# Patient Record
Sex: FEMALE | Race: WHITE | NOT HISPANIC OR LATINO | Employment: PART TIME | URBAN - METROPOLITAN AREA
[De-identification: names, ages, dates, MRNs, and addresses within clinical notes are randomized per-mention and may not be internally consistent; named-entity substitution may affect disease eponyms.]

---

## 2017-01-18 ENCOUNTER — PRE VISIT (OUTPATIENT)
Dept: CARDIOLOGY | Facility: CLINIC | Age: 42
End: 2017-01-18

## 2017-01-19 ENCOUNTER — OFFICE VISIT (OUTPATIENT)
Dept: CARDIOLOGY | Facility: CLINIC | Age: 42
End: 2017-01-19
Payer: COMMERCIAL

## 2017-01-19 VITALS
SYSTOLIC BLOOD PRESSURE: 96 MMHG | HEART RATE: 68 BPM | WEIGHT: 134.8 LBS | DIASTOLIC BLOOD PRESSURE: 64 MMHG | BODY MASS INDEX: 22.46 KG/M2 | HEIGHT: 65 IN

## 2017-01-19 DIAGNOSIS — Z95.2 S/P PULMONARY VALVE REPLACEMENT: ICD-10-CM

## 2017-01-19 DIAGNOSIS — I37.0 NONRHEUMATIC PULMONARY VALVE STENOSIS: Primary | ICD-10-CM

## 2017-01-19 PROCEDURE — 99213 OFFICE O/P EST LOW 20 MIN: CPT | Performed by: INTERNAL MEDICINE

## 2017-01-19 RX ORDER — DROSPIRENONE AND ETHINYL ESTRADIOL 0.02-3(28)
1 KIT ORAL DAILY
COMMUNITY

## 2017-01-19 NOTE — PROGRESS NOTES
HPI and Plan:   See dictation    Orders Placed This Encounter   Procedures     Follow-Up with Cardiologist     Echocardiogram       Orders Placed This Encounter   Medications     drospirenone-ethinyl estradiol (LORYNA) 3-0.02 MG per tablet     Sig: Take 1 tablet by mouth daily       There are no discontinued medications.      Encounter Diagnoses   Name Primary?     Nonrheumatic pulmonary valve stenosis Yes     S/P pulmonary valve replacement        CURRENT MEDICATIONS:  Current Outpatient Prescriptions   Medication Sig Dispense Refill     drospirenone-ethinyl estradiol (LORYNA) 3-0.02 MG per tablet Take 1 tablet by mouth daily       Probiotic Product (PROBIOTIC DAILY PO) Take by mouth daily       fish oil-omega-3 fatty acids 1000 MG capsule Take 1 g by mouth daily       LANsoprazole (PREVACID) 30 MG capsule Take 30 mg by mouth daily       CALCIUM + D 600-200 MG-IU OR TABS 1 TABLET DAILY 0 0     CORTEF 10 MG OR TABS 1 tab po alternating with 1.5 tabs po qday 0 0     SYNTHROID 112 MCG OR TABS 1 TABLET DAILY 0 0       ALLERGIES     Allergies   Allergen Reactions     Codeine      Nausea/GI upset     Sulfa Drugs      Ancef [Cefazolin] Rash       PAST MEDICAL HISTORY:  Past Medical History   Diagnosis Date     Panhypopituitarism (H)      Congenital infundibular pulmonic stenosis      Esophageal reflux      Pulmonary valve disorders      Primary pulmonary hypertension (H)      S/P pulmonary valve replacement with bioprosthetic valve 2006     and pericardial patch       PAST SURGICAL HISTORY:  Past Surgical History   Procedure Laterality Date     C excis pituitary,transnasal/septal  1988/90/92     adenoma pituitary     Hc radiation therapy special treatment procedure  1992     5 weeks to the pituitary area     C repair pulmonary art sten  1975     5 months     C appendectomy  1999     [place]     Hc tooth extraction w/forcep  age 16     2 teeth surgeries     Hc colonoscopy thru stoma, diagnostic  3/04     C anesth,ugi  "endoscopy  3/04     Dr Bettencourt/ hiatal hernia     Endoscopic injection/implant  4/2005     injection into EGD area     C replacement, pulmonary valve  1/3/2006     [place]       FAMILY HISTORY:  Family History   Problem Relation Age of Onset     Allergies No family hx of      CANCER No family hx of      DIABETES No family hx of      Lipids No family hx of      Coronary Artery Disease Sister        SOCIAL HISTORY:  Social History     Social History     Marital Status:      Spouse Name: N/A     Number of Children: 0     Years of Education: 16     Occupational History     rn [employer]     Labor and delivery      [employer]     Social History Main Topics     Smoking status: Never Smoker      Smokeless tobacco: Not on file     Alcohol Use: No     Drug Use: No     Sexual Activity: Not Currently     Other Topics Concern      Service No     Blood Transfusions No     Caffeine Concern No     1 coffee a day, maybe 1 soda a week     Occupational Exposure Yes     Hobby Hazards No     Sleep Concern No     Stress Concern No     Weight Concern No     Special Diet No     Back Care No     Exercise No     Bike Helmet No     Seat Belt Yes     Self-Exams No     Social History Narrative       Review of Systems:  Skin:  Negative       Eyes:  Positive for glasses;contacts    ENT:  Negative      Respiratory:  Negative       Cardiovascular:  Negative      Gastroenterology: Negative      Genitourinary:  Negative      Musculoskeletal:  Negative      Neurologic:  Positive for numbness or tingling of hands of the right hand at night  Psychiatric:  not assessed      Heme/Lymph/Imm:         Endocrine:  Positive for thyroid disorder      Physical Exam:  Vitals: BP 96/64 mmHg  Pulse 68  Ht 1.651 m (5' 5\")  Wt 61.145 kg (134 lb 12.8 oz)  BMI 22.43 kg/m2    Constitutional:  cooperative, alert and oriented, well developed, well nourished, in no acute distress        Skin:  warm and dry to the touch, no apparent skin lesions " or masses noted        Head:  normocephalic, no masses or lesions        Eyes:           ENT:  no pallor or cyanosis, dentition good        Neck:  carotid pulses are full and equal bilaterally, JVP normal, no carotid bruit, no thyromegaly        Chest:  normal breath sounds, clear to auscultation, normal A-P diameter, normal symmetry, normal respiratory excursion, no use of accessory muscles;healed median sternotomy scar          Cardiac: regular rhythm     crisp prosthetic valve sounds systolic ejection murmur;LUSB   early diastolic murmur;blowing;LUSB      Abdomen:  abdomen soft, non-tender, BS normoactive, no mass, no HSM, no bruits        Vascular: pulses full and equal, no bruits auscultated                                        Extremities and Back:  no deformities, clubbing, cyanosis, erythema observed;no edema              Neurological:  affect appropriate, oriented to time, person and place;no gross motor deficits              CC  Julia Helm MD  [facility]  [address] [place] [address]

## 2017-01-19 NOTE — Clinical Note
2017      Julia Helm MD    [facility]    [address] [place] [address]       RE: Giana Unger   MRN: [id number]   : 1975      Dear Dr. Helm:      It was my pleasure to see your patient, Giana Unger, who is a very pleasant 41-year-old female patient who as you know has congenital heart disease.  This is a patient who has congenital pulmonary stenosis.  She underwent pulmonary valve replacement with Dr. Kayley Villegas at the [facility] 9 years ago.  She had done well since that time.  Two years ago the echocardiogram showed that the patient had a mild degree of regurgitation across the bioprosthetic pulmonary valve.  The mean gradient was normal at 16 mmHg.  The echo was repeated at the end of December, on the .  This showed that the degree of pulmonary regurgitation was felt to be moderate.  The mean gradient across the pulmonary valve for some reason was not measured.  The peak gradient was 31.6 mmHg.  That is close to what it was 2 years ago at 29 mmHg.  The right ventricle appears to be normal in size with normal right ventricular function, and pulmonary pressures are normal.  The patient has no symptoms of right heart failure.  She has no shortness of breath.      IMPRESSION:   1.  Bioprosthetic pulmonary valve.  The bioprosthetic pulmonary valve appears to be moderately regurgitant.  I reviewed both the  and  echocardiograms, and clearly there has been deterioration in the regurgitation across this bioprosthetic pulmonary valve.  There is no evidence, however, of right ventricular dysfunction, and pulmonary pressures are normal.  The right ventricle is also normal in size.      PLAN:  I will repeat the echocardiogram in 6 months' time, which is earlier than previously.  I have asked the patient to watch out for symptoms of right heart failure including increasing ankle edema, increasing abdominal girth and shortness of breath.  The  patient does use antibiotic prophylaxis for any dental or surgical procedures.  I will look forward to seeing her again at that time.      Sincerely,            Camron Jc MD, FACC

## 2017-01-20 NOTE — PROGRESS NOTES
2017      Julia Helm MD    [facility]    [address] [place] [address]       RE: Giana Unger   MRN: [id number]   : 1975      Dear Dr. Helm:      It was my pleasure to see your patient, Giana Unger, who is a very pleasant 41-year-old female patient who as you know has congenital heart disease.  This is a patient who has congenital pulmonary stenosis.  She underwent pulmonary valve replacement with Dr. Kayley Villegas at the [facility] 9 years ago.  She had done well since that time.  Two years ago the echocardiogram showed that the patient had a mild degree of regurgitation across the bioprosthetic pulmonary valve.  The mean gradient was normal at 16 mmHg.  The echo was repeated at the end of December, on the .  This showed that the degree of pulmonary regurgitation was felt to be moderate.  The mean gradient across the pulmonary valve for some reason was not measured.  The peak gradient was 31.6 mmHg.  That is close to what it was 2 years ago at 29 mmHg.  The right ventricle appears to be normal in size with normal right ventricular function, and pulmonary pressures are normal.  The patient has no symptoms of right heart failure.  She has no shortness of breath.      IMPRESSION:   1.  Bioprosthetic pulmonary valve.  The bioprosthetic pulmonary valve appears to be moderately regurgitant.  I reviewed both the  and  echocardiograms, and clearly there has been deterioration in the regurgitation across this bioprosthetic pulmonary valve.  There is no evidence, however, of right ventricular dysfunction, and pulmonary pressures are normal.  The right ventricle is also normal in size.      PLAN:  I will repeat the echocardiogram in 6 months' time, which is earlier than previously.  I have asked the patient to watch out for symptoms of right heart failure including increasing ankle edema, increasing abdominal girth and shortness of breath.  The  patient does use antibiotic prophylaxis for any dental or surgical procedures.  I will look forward to seeing her again at that time.      Sincerely,            Camron Jc MD, FACC         CAMRON ISBELL MD, FACC             D: 2017 14:18   T: 2017 05:01   MT: CHARISSE      Name:     JEREL BURCH   MRN:      [id number]        Account:      [id number]   :      1975           Service Date: 2017      Document: [id number]

## 2017-06-29 ENCOUNTER — CARE COORDINATION (OUTPATIENT)
Dept: CARDIOLOGY | Facility: CLINIC | Age: 42
End: 2017-06-29

## 2017-06-29 NOTE — PROGRESS NOTES
Received faxed request from [facility] for cardiac clearance to participate in a chronic spine program. Form signed by Dr. Jc & faxed to [facility]. LPenfield RN

## 2017-07-29 ENCOUNTER — HEALTH MAINTENANCE LETTER (OUTPATIENT)
Age: 42
End: 2017-07-29

## 2017-10-03 ENCOUNTER — OFFICE VISIT (OUTPATIENT)
Dept: CARDIOLOGY | Facility: CLINIC | Age: 42
End: 2017-10-03
Attending: INTERNAL MEDICINE
Payer: COMMERCIAL

## 2017-10-03 ENCOUNTER — HOSPITAL ENCOUNTER (OUTPATIENT)
Dept: CARDIOLOGY | Facility: CLINIC | Age: 42
Discharge: HOME | End: 2017-10-03
Attending: INTERNAL MEDICINE | Admitting: INTERNAL MEDICINE
Payer: COMMERCIAL

## 2017-10-03 VITALS
SYSTOLIC BLOOD PRESSURE: 100 MMHG | DIASTOLIC BLOOD PRESSURE: 66 MMHG | HEART RATE: 67 BPM | BODY MASS INDEX: 22.13 KG/M2 | HEIGHT: 65 IN | WEIGHT: 132.8 LBS

## 2017-10-03 DIAGNOSIS — Z95.2 S/P PULMONARY VALVE REPLACEMENT: ICD-10-CM

## 2017-10-03 DIAGNOSIS — I37.0 NONRHEUMATIC PULMONARY VALVE STENOSIS: ICD-10-CM

## 2017-10-03 PROCEDURE — 93306 TTE W/DOPPLER COMPLETE: CPT | Mod: 26 | Performed by: INTERNAL MEDICINE

## 2017-10-03 PROCEDURE — 40000264 ECHO COMPLETE WITH OPTISON

## 2017-10-03 PROCEDURE — 25500064 ZZH RX 255 OP 636: Performed by: INTERNAL MEDICINE

## 2017-10-03 PROCEDURE — 99213 OFFICE O/P EST LOW 20 MIN: CPT | Mod: 25 | Performed by: INTERNAL MEDICINE

## 2017-10-03 RX ADMIN — HUMAN ALBUMIN MICROSPHERES AND PERFLUTREN 3 ML: 10; .22 INJECTION, SOLUTION INTRAVENOUS at 10:30

## 2017-10-03 NOTE — PROGRESS NOTES
HPI and Plan:   See dictation    Orders Placed This Encounter   Procedures     Follow-Up with Cardiologist     Echocardiogram       No orders of the defined types were placed in this encounter.      There are no discontinued medications.      Encounter Diagnoses   Name Primary?     Nonrheumatic pulmonary valve stenosis      S/P pulmonary valve replacement        CURRENT MEDICATIONS:  Current Outpatient Prescriptions   Medication Sig Dispense Refill     drospirenone-ethinyl estradiol (LORYNA) 3-0.02 MG per tablet Take 1 tablet by mouth daily       Probiotic Product (PROBIOTIC DAILY PO) Take by mouth daily       fish oil-omega-3 fatty acids 1000 MG capsule Take 1 g by mouth daily       LANsoprazole (PREVACID) 30 MG capsule Take 30 mg by mouth daily       CALCIUM + D 600-200 MG-IU OR TABS 1 TABLET DAILY 0 0     CORTEF 10 MG OR TABS 1 tab po alternating with 1.5 tabs po qday 0 0     SYNTHROID 112 MCG OR TABS 1 TABLET DAILY 0 0       ALLERGIES     Allergies   Allergen Reactions     Codeine      Nausea/GI upset     Sulfa Drugs      Ancef [Cefazolin] Rash       PAST MEDICAL HISTORY:  Past Medical History:   Diagnosis Date     Congenital infundibular pulmonic stenosis      Esophageal reflux      Panhypopituitarism (H)      Primary pulmonary hypertension (H)      Pulmonary valve disorders      S/P pulmonary valve replacement with bioprosthetic valve 2006    and pericardial patch       PAST SURGICAL HISTORY:  Past Surgical History:   Procedure Laterality Date     C ANESTH,UGI ENDOSCOPY  3/04    Dr Bettencourt/ hiatal hernia     C APPENDECTOMY  1999    [place]     C EXCIS PITUITARY,TRANSNASAL/SEPTAL  1988/90/92    adenoma pituitary     C REPAIR PULMONARY ART STEN  1975    5 months     C REPLACEMENT, PULMONARY VALVE  1/3/2006    [place]     ENDOSCOPIC INJECTION/IMPLANT  4/2005    injection into EGD area     HC COLONOSCOPY THRU STOMA, DIAGNOSTIC  3/04      RADIATION THERAPY SPECIAL TREATMENT PROCEDURE  1992    5 weeks to the pituitary  "area     HC TOOTH EXTRACTION W/FORCEP  age 16    2 teeth surgeries       FAMILY HISTORY:  Family History   Problem Relation Age of Onset     Coronary Artery Disease Sister      Allergies No family hx of      CANCER No family hx of      DIABETES No family hx of      Lipids No family hx of        SOCIAL HISTORY:  Social History     Social History     Marital status:      Spouse name: N/A     Number of children: 0     Years of education: 16     Occupational History     rn [employer]     Labor and delivery      [employer]     Social History Main Topics     Smoking status: Never Smoker     Smokeless tobacco: Never Used     Alcohol use No     Drug use: No     Sexual activity: Not Currently     Other Topics Concern      Service No     Blood Transfusions No     Caffeine Concern No     1 coffee a day, maybe 1 soda a week     Occupational Exposure Yes     Hobby Hazards No     Sleep Concern No     Stress Concern No     Weight Concern No     Special Diet No     Back Care No     Exercise No     Bike Helmet No     Seat Belt Yes     Self-Exams No     Social History Narrative       Review of Systems:  Skin:  Negative       Eyes:  Positive for glasses;contacts    ENT:  Negative      Respiratory:  Negative       Cardiovascular:  Negative      Gastroenterology: Negative      Genitourinary:  Negative      Musculoskeletal:  Negative      Neurologic:  Positive for numbness or tingling of hands of the right hand at night  Psychiatric:  Negative      Heme/Lymph/Imm:  Negative      Endocrine:  Positive for thyroid disorder      Physical Exam:  Vitals: /66  Pulse 67  Ht 1.651 m (5' 5\")  Wt 60.2 kg (132 lb 12.8 oz)  BMI 22.1 kg/m2    Constitutional:  cooperative, alert and oriented, well developed, well nourished, in no acute distress        Skin:  warm and dry to the touch, no apparent skin lesions or masses noted        Head:  normocephalic, no masses or lesions        Eyes:           ENT:  no pallor or " cyanosis, dentition good        Neck:  carotid pulses are full and equal bilaterally, JVP normal, no carotid bruit, no thyromegaly        Chest:  normal breath sounds, clear to auscultation, normal A-P diameter, normal symmetry, normal respiratory excursion, no use of accessory muscles;healed median sternotomy scar          Cardiac: regular rhythm     crisp prosthetic valve sounds systolic ejection murmur;LUSB   early diastolic murmur;blowing;LUSB      Abdomen:  abdomen soft, non-tender, BS normoactive, no mass, no HSM, no bruits        Vascular: pulses full and equal, no bruits auscultated                                        Extremities and Back:  no deformities, clubbing, cyanosis, erythema observed;no edema              Neurological:  affect appropriate, oriented to time, person and place;no gross motor deficits              CC  Camron Jc MD  [address]  [place] [address]

## 2017-10-03 NOTE — MR AVS SNAPSHOT
After Visit Summary   10/3/2017    Giana Unger    MRN: [id number]           Patient Information     Date Of Birth          1975        Visit Information        Provider Department      10/3/2017 12:45 PM Camron Jc MD [facility]        Today's Diagnoses     Nonrheumatic pulmonary valve stenosis        S/P pulmonary valve replacement           Follow-ups after your visit        Additional Services     Follow-Up with Cardiologist                 Future tests that were ordered for you today     Open Future Orders        Priority Expected Expires Ordered    Echocardiogram Routine 10/3/2018 10/4/2018 10/3/2017    Follow-Up with Cardiologist Routine 10/3/2018 10/4/2018 10/3/2017    ECHO COMPLETE WITH OPTISON Routine 7/18/2017 1/19/2018 1/19/2017            Who to contact     If you have questions or need follow up information about today's clinic visit or your schedule please contact [facility] directly at [phone].  Normal or non-critical lab and imaging results will be communicated to you by ManageIQhart, letter or phone within 4 business days after the clinic has received the results. If you do not hear from us within 7 days, please contact the clinic through [email or web address] or phone. If you have a critical or abnormal lab result, we will notify you by phone as soon as possible.  Submit refill requests through elicit or call your pharmacy and they will forward the refill request to us. Please allow 3 business days for your refill to be completed.          Additional Information About Your Visit        MyChart Information     elicit gives you secure access to your electronic health record. If you see a primary care provider, you can also send messages to your care team and make appointments. If you have questions, please call your primary care clinic.  If you do not have a primary care provider, please  "call [phone] and they will assist you.        Care EveryWhere ID     This is your Care EveryWhere ID. This could be used by other organizations to access your [facility] medical records  [id number]        Your Vitals Were     Pulse Height BMI (Body Mass Index)             67 1.651 m (5' 5\") 22.1 kg/m2          Blood Pressure from Last 3 Encounters:   10/03/17 100/66   01/19/17 96/64   12/16/14 102/66    Weight from Last 3 Encounters:   10/03/17 60.2 kg (132 lb 12.8 oz)   01/19/17 61.1 kg (134 lb 12.8 oz)   12/16/14 59.9 kg (132 lb)              We Performed the Following     Follow-Up with Cardiologist        Primary Care Provider Office Phone # Fax #    Julia Helm [phone]       [facility] [address] [facility] [address]        Equal Access to Services     [facility] CenterLALITHA : Hadii aad ku hadasho Soomaali, waaxda luqadaha, qaybta kaalmada adeegyada, waxay idiin hayjesusn farooq lyles . So [facility] [phone].    ATENCIÓN: Si habla español, tiene a bustillo disposición servicios gratuitos de asistencia lingüística. Llame al [phone].    We comply with applicable federal civil rights laws and [place] laws. We do not discriminate on the basis of race, color, national origin, age, disability, sex, sexual orientation, or gender identity.            Thank you!     Thank you for choosing [facility] [place]  for your care. Our goal is always to provide you with excellent care. Hearing back from our patients is one way we can continue to improve our services. Please take a few minutes to complete the written survey that you may receive in the mail after your visit with us. Thank you!             Your Updated Medication List - Protect others around you: Learn how to safely use, store and throw away your medicines at www.disposemymeds.org.          This list is accurate as of: 10/3/17  1:30 PM.  Always use your most recent med list.             "       Brand Name Dispense Instructions for use Diagnosis    calcium + D 600-200 MG-UNIT Tabs   Generic drug:  calcium carbonate-vitamin D     0    1 TABLET DAILY    Panhypopituitarism (H)       CORTEF 10 MG tablet   Generic drug:  hydrocortisone     0    1 tab po alternating with 1.5 tabs po qday    Panhypopituitarism (H)       fish oil-omega-3 fatty acids 1000 MG capsule      Take 1 g by mouth daily        LANsoprazole 30 MG CR capsule    PREVACID     Take 30 mg by mouth daily        LORYNA 3-0.02 MG per tablet   Generic drug:  drospirenone-ethinyl estradiol      Take 1 tablet by mouth daily        PROBIOTIC DAILY PO      Take by mouth daily        SYNTHROID 112 MCG tablet   Generic drug:  levothyroxine     0    1 TABLET DAILY    Panhypopituitarism (H)

## 2017-10-03 NOTE — LETTER
10/3/2017    Julia Helm MD  [facility]   [address]  [facility] [address]    RE: Giana Unger       Dear Colleague,    I had the pleasure of seeing Giana Unger in the [facility].    REFERRING PHYSICIAN:  Julia Helm MD       It has been a pleasure to see your patient, Giana Unger, in followup.  As you know, this is a 42-year-old patient with a history of infundibular and pulmonary valvular stenosis.  The patient was developing right ventricular dilatation of the right ventricle.  The patient then underwent a pulmonary valve replacement with Dr. Kayley Villegas.  A bioprosthetic valve was implanted.  She is doing quite well at present.  She has no symptoms at all.  The gradients across the pulmonary valve are in fact less than they were previously.  The degree of pulmonary regurgitation has not changed significantly. I would have estimated it as mild on viewing the pulmonary valve prosthesis myself.  Right ventricular size and function look good.  The mean gradient across the pulmonary valve was 11 mmHg with a peak gradient of 14.7 mmHg.  She has no ankle edema.  She has no shortness of breath.  She does use antibiotic prophylaxis for any dental or surgical procedures.  Her blood pressure is normal at 100/66.     Outpatient Encounter Prescriptions as of 10/3/2017   Medication Sig Dispense Refill     drospirenone-ethinyl estradiol (LORYNA) 3-0.02 MG per tablet Take 1 tablet by mouth daily       Probiotic Product (PROBIOTIC DAILY PO) Take by mouth daily       fish oil-omega-3 fatty acids 1000 MG capsule Take 1 g by mouth daily       LANsoprazole (PREVACID) 30 MG capsule Take 30 mg by mouth daily       CALCIUM + D 600-200 MG-IU OR TABS 1 TABLET DAILY 0 0     CORTEF 10 MG OR TABS 1 tab po alternating with 1.5 tabs po qday 0 0     SYNTHROID 112 MCG OR TABS 1 TABLET DAILY 0 0     [DISCONTINUED] sodium chloride (PF) 0.9% PF flush 10 mL        No  facility-administered encounter medications on file as of 10/3/2017.       IMPRESSION:   1.  Bioprosthetic pulmonary valve.  The valve has not deteriorated from 6 months ago.  The gradients are not severely stenotic and the degree of pulmonary regurgitation has not changed.     2.  Normotensive.      PLAN:  I will see the patient back again in 1 year's time and as always, I have reminded the patient about using antibiotic prophylaxis for any dental or surgical procedures.      It has been my pleasure to be involved in the care of this extremely nice patient.     Sincerely,    Camron Jc MD    [facility]

## 2017-10-03 NOTE — PROGRESS NOTES
REFERRING PHYSICIAN:  Julia Helm MD       HISTORY OF PRESENT ILLNESS:  It has been a pleasure to see your patient, Giana Burch, in followup.  As you know, this is a 42-year-old patient with a history of infundibular and pulmonary valvular stenosis.  The patient was developing right ventricular dilatation of the right ventricle.  The patient then underwent a pulmonary valve replacement with Dr. Kayley Villegas.  A bioprosthetic valve was implanted.  She is doing quite well at present.  She has no symptoms at all.  The gradients across the pulmonary valve are in fact less than they were previously.  The degree of pulmonary regurgitation has not changed significantly. I would have estimated it as mild on viewing the pulmonary valve prosthesis myself.  Right ventricular size and function look good.  The mean gradient across the pulmonary valve was 11 mmHg with a peak gradient of 14.7 mmHg.  She has no ankle edema.  She has no shortness of breath.  She does use antibiotic prophylaxis for any dental or surgical procedures.  Her blood pressure is normal at 100/66.      IMPRESSION:   1.  Bioprosthetic pulmonary valve.  The valve has not deteriorated from 6 months ago.  The gradients are not severely stenotic and the degree of pulmonary regurgitation has not changed.     2.  Normotensive.      PLAN:  I will see the patient back again in 1 year's time and as always, I have reminded the patient about using antibiotic prophylaxis for any dental or surgical procedures.      It has been my pleasure to be involved in the care of this extremely nice patient.      Camron Ramey MD, FACC         CAMRON RAMEY MD, FACC             D: 10/03/2017 13:28   T: 10/03/2017 14:57   MT: DEVON      Name:     GIANA BURCH   MRN:      [id number]        Account:      [id number]   :      1975           Service Date: 10/03/2017      Document: [id number]

## 2018-11-13 ENCOUNTER — HOSPITAL ENCOUNTER (OUTPATIENT)
Dept: CARDIOLOGY | Facility: CLINIC | Age: 43
Discharge: HOME | End: 2018-11-13
Attending: INTERNAL MEDICINE | Admitting: INTERNAL MEDICINE
Payer: COMMERCIAL

## 2018-11-13 DIAGNOSIS — Z95.2 S/P PULMONARY VALVE REPLACEMENT: ICD-10-CM

## 2018-11-13 DIAGNOSIS — I37.0 NONRHEUMATIC PULMONARY VALVE STENOSIS: ICD-10-CM

## 2018-11-13 PROCEDURE — 93306 TTE W/DOPPLER COMPLETE: CPT | Mod: 26 | Performed by: INTERNAL MEDICINE

## 2018-11-13 PROCEDURE — 93306 TTE W/DOPPLER COMPLETE: CPT

## 2018-11-27 ENCOUNTER — OFFICE VISIT (OUTPATIENT)
Dept: CARDIOLOGY | Facility: CLINIC | Age: 43
End: 2018-11-27
Payer: COMMERCIAL

## 2018-11-27 VITALS
SYSTOLIC BLOOD PRESSURE: 100 MMHG | DIASTOLIC BLOOD PRESSURE: 70 MMHG | BODY MASS INDEX: 22.64 KG/M2 | WEIGHT: 135.9 LBS | HEART RATE: 60 BPM | HEIGHT: 65 IN

## 2018-11-27 DIAGNOSIS — Z95.2 S/P PULMONARY VALVE REPLACEMENT: ICD-10-CM

## 2018-11-27 DIAGNOSIS — I37.0 NONRHEUMATIC PULMONARY VALVE STENOSIS: ICD-10-CM

## 2018-11-27 PROCEDURE — 99214 OFFICE O/P EST MOD 30 MIN: CPT | Performed by: INTERNAL MEDICINE

## 2018-11-27 NOTE — PROGRESS NOTES
Service Date: 11/27/2018      REFERRING PHYSICIAN:  Dr. Julia Helm.        HISTORY OF PRESENT ILLNESS:  It is my pleasure to see your patient, Giana Unger, who is a very pleasant patient who had both infundibular and pulmonary valvular stenosis.  She had a repair as a child.  Then she began to develop severe pulmonary regurgitation with evidence of right ventricular enlargement and right ventricular dysfunction.  In 2006 she underwent bioprosthetic aortic valve replacement with Dr. Cj Villegas at [facility].  The right ventricular size and function then returned to normal.  The patient has perivalvular pulmonary regurgitation.  The mean gradient has always been somewhat on the higher side.  Going back to 2014, the mean gradient across the pulmonary prosthesis was 16 mmHg with a maximum instantaneous gradient of 29 mmHg.  Last year the degree of the mean gradient across the pulmonary valve was 8 mmHg with a maximum instantaneous gradient of 14.7 mmHg.  This year the mean gradient across the pulmonary valve prosthesis was 14 mmHg with a maximum instantaneous gradient of 23 mmHg.  So the mean gradient tends to vary.  Her right ventricular size and systolic function are normal.  The degree of pulmonary regurgitation was called moderate.  I looked at the echo myself and I would call it more on the milder side in the mild to moderate range.  There does not seem to be any significant difference in degree of regurgitation from previously.  She feels well.  She has no shortness of breath.  She has no orthopnea or PND.  She does use antibiotic prophylaxis for any dental or surgical procedures.  Her blood pressure is normal at 100/70.      IMPRESSION:   1.  Bioprosthetic pulmonary valve with mildly increased mean gradients across the pulmonary valve which are not significantly different from 2014 or 2015.  I would assess the degree of bioprosthetic pulmonary regurgitation to be in the mild to moderate range and  unchanged from previously.   2.  Does use antibiotic prophylaxis.   3.  No evidence of right ventricular dysfunction or right ventricular enlargement.      PLAN:  We will continue to follow the patient, and the patient will be employing antibiotic prophylaxis for any dental or surgical procedures.  We will see the patient back again in 1 year's time, and we will repeat her echocardiogram at that stage.      It has been my pleasure to be involved in the care of this very nice patient.   Camron Jc MD, FACC       cc:   [place] [address]         CAMRON ISBELL MD, FACC             D: 2018   T: 2018   MT: CHARISSE      Name:     JEREL BURCH   MRN:      [id number]        Account:      [id number]   :      1975           Service Date: 2018      Document: [id number]

## 2018-11-27 NOTE — PROGRESS NOTES
HPI and Plan:   See dictation    Orders Placed This Encounter   Procedures     Follow-Up with Cardiologist     Echocardiogram       Orders Placed This Encounter   Medications     AMOXICILLIN PO     Sig: Take 1,000 mg by mouth TAKES BEFORE DENTAL VISIT       There are no discontinued medications.      Encounter Diagnoses   Name Primary?     Nonrheumatic pulmonary valve stenosis      S/P pulmonary valve replacement        CURRENT MEDICATIONS:  Current Outpatient Prescriptions   Medication Sig Dispense Refill     AMOXICILLIN PO Take 1,000 mg by mouth TAKES BEFORE DENTAL VISIT       CALCIUM + D 600-200 MG-IU OR TABS 1 TABLET DAILY 0 0     CORTEF 10 MG OR TABS 1 tab po alternating with 1.5 tabs po qday 0 0     drospirenone-ethinyl estradiol (LORYNA) 3-0.02 MG per tablet Take 1 tablet by mouth daily       fish oil-omega-3 fatty acids 1000 MG capsule Take 1 g by mouth daily       LANsoprazole (PREVACID) 30 MG capsule Take 30 mg by mouth daily       Probiotic Product (PROBIOTIC DAILY PO) Take by mouth daily       SYNTHROID 112 MCG OR TABS 1 TABLET DAILY 0 0       ALLERGIES     Allergies   Allergen Reactions     Ancef [Cefazolin] Rash and Hives     Codeine      Nausea/GI upset     Nitrofuran Derivatives Other (See Comments)     nausea and dizziness     Sulfa Drugs Hives       PAST MEDICAL HISTORY:  Past Medical History:   Diagnosis Date     Congenital infundibular pulmonic stenosis      Esophageal reflux      Heart valve replaced 1/12/2006     Problem list name updated by automated process. Provider to review     Low back pain      Osteopenia      Panhypopituitarism (H)      Pituitary adenoma (H) 1986    surgeries x 3     Primary pulmonary hypertension (H)      Pulmonary valve disorders      S/P pulmonary valve replacement with bioprosthetic valve 2006    and pericardial patch     Sciatica        PAST SURGICAL HISTORY:  Past Surgical History:   Procedure Laterality Date     C ANESTH,UGI ENDOSCOPY  3/04    Dr Bettencourt/ [place]  hernia     C APPENDECTOMY  1999    [place]     C EXCIS PITUITARY,TRANSNASAL/SEPTAL  1988/90/92    adenoma pituitary     C REPAIR PULMONARY ART STEN  1975    5 months     C REPLACEMENT, PULMONARY VALVE  1/3/2006    [place]     ENDOSCOPIC INJECTION/IMPLANT  4/2005    injection into EGD area     HC COLONOSCOPY THRU STOMA, DIAGNOSTIC  3/04     HC RADIATION THERAPY SPECIAL TREATMENT PROCEDURE  1992    5 weeks to the pituitary area     HC TOOTH EXTRACTION W/FORCEP  age 16    2 teeth surgeries       FAMILY HISTORY:  Family History   Problem Relation Age of Onset     Coronary Artery Disease Sister      Allergies No family hx of      Cancer No family hx of      Diabetes No family hx of      Lipids No family hx of        SOCIAL HISTORY:  Social History     Social History     Marital status:      Spouse name: N/A     Number of children: 0     Years of education: 16     Occupational History     rn [employer]     Labor and delivery      [employer]     Social History Main Topics     Smoking status: Never Smoker     Smokeless tobacco: Never Used     Alcohol use No     Drug use: No     Sexual activity: Not Currently     Other Topics Concern      Service No     Blood Transfusions No     Caffeine Concern No     1 coffee a day, maybe 1 soda a week     Occupational Exposure Yes     Hobby Hazards No     Sleep Concern No     Stress Concern No     Weight Concern No     Special Diet No     Back Care No     Exercise No     Bike Helmet No     Seat Belt Yes     Self-Exams No     Social History Narrative       Review of Systems:  Skin:  Negative       Eyes:  Positive for glasses;contacts    ENT:  Negative      Respiratory:  Negative       Cardiovascular:  Negative      Gastroenterology: Negative      Genitourinary:  Negative      Musculoskeletal:  Positive for back pain    Neurologic:  Positive for numbness or tingling of hands of the right hand at night  Psychiatric:  Negative      Heme/Lymph/Imm:  Negative     "  Endocrine:  Positive for thyroid disorder      Physical Exam:  Vitals: /70 (BP Location: Right arm, Patient Position: Sitting, Cuff Size: Adult Regular)  Pulse 60  Ht 1.651 m (5' 5\")  Wt 61.6 kg (135 lb 14.4 oz)  Breastfeeding? No  BMI 22.61 kg/m2    Constitutional:  cooperative, alert and oriented, well developed, well nourished, in no acute distress        Skin:  warm and dry to the touch, no apparent skin lesions or masses noted          Head:  normocephalic, no masses or lesions        Eyes:  pupils equal and round        Lymph:No Cervical lymphadenopathy present     ENT:  no pallor or cyanosis, dentition good        Neck:  carotid pulses are full and equal bilaterally, JVP normal, no carotid bruit        Respiratory:  normal breath sounds, clear to auscultation, normal A-P diameter, normal symmetry, normal respiratory excursion, no use of accessory muscles;healed median sternotomy scar         Cardiac: regular rhythm     crisp prosthetic valve sounds systolic ejection murmur;LUSB   early diastolic murmur;blowing;LUSB    pulses full and equal, no bruits auscultated                                        GI:  abdomen soft, non-tender, BS normoactive, no mass, no HSM, no bruits        Extremities and Muscular Skeletal:  no deformities, clubbing, cyanosis, erythema observed;no edema              Neurological:  no gross motor deficits;affect appropriate        Psych:  Alert and Oriented x 3        CC  Camron Jc MD  [address]  [place] [id number]              "

## 2018-11-27 NOTE — LETTER
11/27/2018      Julia Helm MD  [facility] [address]  [facility] [address]      RE: Giana Unger       Dear Colleague,    I had the pleasure of seeing Giana Unger in the [facility].    Service Date: 11/27/2018      REFERRING PHYSICIAN:  Dr. Julia Helm.        HISTORY OF PRESENT ILLNESS:  It is my pleasure to see your patient, Giana Unger, who is a very pleasant patient who had both infundibular and pulmonary valvular stenosis.  She had a repair as a child.  Then she began to develop severe pulmonary regurgitation with evidence of right ventricular enlargement and right ventricular dysfunction.  In 2006 she underwent bioprosthetic aortic valve replacement with Dr. Cj Villegas at [facility].  The right ventricular size and function then returned to normal.  The patient has perivalvular pulmonary regurgitation.  The mean gradient has always been somewhat on the higher side.  Going back to 2014, the mean gradient across the pulmonary prosthesis was 16 mmHg with a maximum instantaneous gradient of 29 mmHg.  Last year the degree of the mean gradient across the pulmonary valve was 8 mmHg with a maximum instantaneous gradient of 14.7 mmHg.  This year the mean gradient across the pulmonary valve prosthesis was 14 mmHg with a maximum instantaneous gradient of 23 mmHg.  So the mean gradient tends to vary.  Her right ventricular size and systolic function are normal.  The degree of pulmonary regurgitation was called moderate.  I looked at the echo myself and I would call it more on the milder side in the mild to moderate range.  There does not seem to be any significant difference in degree of regurgitation from previously.  She feels well.  She has no shortness of breath.  She has no orthopnea or PND.  She does use antibiotic prophylaxis for any dental or surgical procedures.  Her blood pressure is normal at 100/70.      IMPRESSION:   1.  Bioprosthetic  pulmonary valve with mildly increased mean gradients across the pulmonary valve which are not significantly different from 2014 or 2015.  I would assess the degree of bioprosthetic pulmonary regurgitation to be in the mild to moderate range and unchanged from previously.   2.  Does use antibiotic prophylaxis.   3.  No evidence of right ventricular dysfunction or right ventricular enlargement.      PLAN:  We will continue to follow the patient, and the patient will be employing antibiotic prophylaxis for any dental or surgical procedures.  We will see the patient back again in 1 year's time, and we will repeat her echocardiogram at that stage.      It has been my pleasure to be involved in the care of this very nice patient.   Camron Jc MD, FACC       cc:   [place] [address]         CAMRON ISBELL MD, FACC             D: 2018   T: 2018   MT: CHARISSE      Name:     JEREL BURCH   MRN:      [id number]        Account:      [id number]   :      1975           Service Date: 2018      Document: [id number]         Outpatient Encounter Prescriptions as of 2018   Medication Sig Dispense Refill     AMOXICILLIN PO Take 1,000 mg by mouth TAKES BEFORE DENTAL VISIT       CALCIUM + D 600-200 MG-IU OR TABS 1 TABLET DAILY 0 0     CORTEF 10 MG OR TABS 1 tab po alternating with 1.5 tabs po qday 0 0     drospirenone-ethinyl estradiol (LORYNA) 3-0.02 MG per tablet Take 1 tablet by mouth daily       fish oil-omega-3 fatty acids 1000 MG capsule Take 1 g by mouth daily       LANsoprazole (PREVACID) 30 MG capsule Take 30 mg by mouth daily       Probiotic Product (PROBIOTIC DAILY PO) Take by mouth daily       SYNTHROID 112 MCG OR TABS 1 TABLET DAILY 0 0     No facility-administered encounter medications on file as of 2018.        Again, thank you for allowing me to participate in the care of your patient.       Sincerely,    Camron Jc MD, MD     [facility]

## 2018-11-27 NOTE — LETTER
11/27/2018    Julia Helm MD  [facility] [address]  [facility] [address]    RE: Giana Unger       Dear Colleague,    I had the pleasure of seeing Giana Unger in the [facility].    HPI and Plan:   See dictation    Orders Placed This Encounter   Procedures     Follow-Up with Cardiologist     Echocardiogram       Orders Placed This Encounter   Medications     AMOXICILLIN PO     Sig: Take 1,000 mg by mouth TAKES BEFORE DENTAL VISIT       There are no discontinued medications.      Encounter Diagnoses   Name Primary?     Nonrheumatic pulmonary valve stenosis      S/P pulmonary valve replacement        CURRENT MEDICATIONS:  Current Outpatient Prescriptions   Medication Sig Dispense Refill     AMOXICILLIN PO Take 1,000 mg by mouth TAKES BEFORE DENTAL VISIT       CALCIUM + D 600-200 MG-IU OR TABS 1 TABLET DAILY 0 0     CORTEF 10 MG OR TABS 1 tab po alternating with 1.5 tabs po qday 0 0     drospirenone-ethinyl estradiol (LORYNA) 3-0.02 MG per tablet Take 1 tablet by mouth daily       fish oil-omega-3 fatty acids 1000 MG capsule Take 1 g by mouth daily       LANsoprazole (PREVACID) 30 MG capsule Take 30 mg by mouth daily       Probiotic Product (PROBIOTIC DAILY PO) Take by mouth daily       SYNTHROID 112 MCG OR TABS 1 TABLET DAILY 0 0       ALLERGIES     Allergies   Allergen Reactions     Ancef [Cefazolin] Rash and Hives     Codeine      Nausea/GI upset     Nitrofuran Derivatives Other (See Comments)     nausea and dizziness     Sulfa Drugs Hives       PAST MEDICAL HISTORY:  Past Medical History:   Diagnosis Date     Congenital infundibular pulmonic stenosis      Esophageal reflux      Heart valve replaced 1/12/2006     Problem list name updated by automated process. Provider to review     Low back pain      Osteopenia      Panhypopituitarism (H)      Pituitary adenoma (H) 1986    surgeries x 3     Primary pulmonary hypertension (H)      Pulmonary valve  disorders      S/P pulmonary valve replacement with bioprosthetic valve 2006    and pericardial patch     Sciatica        PAST SURGICAL HISTORY:  Past Surgical History:   Procedure Laterality Date     C ANESTH,UGI ENDOSCOPY  3/04    Dr Bettencourt/ hiatal hernia     C APPENDECTOMY  1999    [place]     C EXCIS PITUITARY,TRANSNASAL/SEPTAL  1988/90/92    adenoma pituitary     C REPAIR PULMONARY ART STEN  1975    5 months     C REPLACEMENT, PULMONARY VALVE  1/3/2006    [place]     ENDOSCOPIC INJECTION/IMPLANT  4/2005    injection into EGD area     HC COLONOSCOPY THRU STOMA, DIAGNOSTIC  3/04     HC RADIATION THERAPY SPECIAL TREATMENT PROCEDURE  1992    5 weeks to the pituitary area     HC TOOTH EXTRACTION W/FORCEP  age 16    2 teeth surgeries       FAMILY HISTORY:  Family History   Problem Relation Age of Onset     Coronary Artery Disease Sister      Allergies No family hx of      Cancer No family hx of      Diabetes No family hx of      Lipids No family hx of        SOCIAL HISTORY:  Social History     Social History     Marital status:      Spouse name: N/A     Number of children: 0     Years of education: 16     Occupational History     rn [employer]     Labor and delivery      [employer]     Social History Main Topics     Smoking status: Never Smoker     Smokeless tobacco: Never Used     Alcohol use No     Drug use: No     Sexual activity: Not Currently     Other Topics Concern      Service No     Blood Transfusions No     Caffeine Concern No     1 coffee a day, maybe 1 soda a week     Occupational Exposure Yes     Hobby Hazards No     Sleep Concern No     Stress Concern No     Weight Concern No     Special Diet No     Back Care No     Exercise No     Bike Helmet No     Seat Belt Yes     Self-Exams No     Social History Narrative       Review of Systems:  Skin:  Negative       Eyes:  Positive for glasses;contacts    ENT:  Negative      Respiratory:  Negative       Cardiovascular:  Negative     "  Gastroenterology: Negative      Genitourinary:  Negative      Musculoskeletal:  Positive for back pain    Neurologic:  Positive for numbness or tingling of hands of the right hand at night  Psychiatric:  Negative      Heme/Lymph/Imm:  Negative      Endocrine:  Positive for thyroid disorder      Physical Exam:  Vitals: /70 (BP Location: Right arm, Patient Position: Sitting, Cuff Size: Adult Regular)  Pulse 60  Ht 1.651 m (5' 5\")  Wt 61.6 kg (135 lb 14.4 oz)  Breastfeeding? No  BMI 22.61 kg/m2    Constitutional:  cooperative, alert and oriented, well developed, well nourished, in no acute distress        Skin:  warm and dry to the touch, no apparent skin lesions or masses noted          Head:  normocephalic, no masses or lesions        Eyes:  pupils equal and round        Lymph:No Cervical lymphadenopathy present     ENT:  no pallor or cyanosis, dentition good        Neck:  carotid pulses are full and equal bilaterally, JVP normal, no carotid bruit        Respiratory:  normal breath sounds, clear to auscultation, normal A-P diameter, normal symmetry, normal respiratory excursion, no use of accessory muscles;healed median sternotomy scar         Cardiac: regular rhythm     crisp prosthetic valve sounds systolic ejection murmur;LUSB   early diastolic murmur;blowing;LUSB    pulses full and equal, no bruits auscultated                                        GI:  abdomen soft, non-tender, BS normoactive, no mass, no HSM, no bruits        Extremities and Muscular Skeletal:  no deformities, clubbing, cyanosis, erythema observed;no edema              Neurological:  no gross motor deficits;affect appropriate        Psych:  Alert and Oriented x 3        CC  Camron Jc MD  [address] [address]                Thank you for allowing me to participate in the care of your patient.      Sincerely,     Camron Jc MD, MD     [facility]    cc: "   Camron Jc MD  [address]  [place] [address]

## 2018-11-27 NOTE — MR AVS SNAPSHOT
After Visit Summary   11/27/2018    Giana Unger    MRN: [id number]           Patient Information     Date Of Birth          1975        Visit Information        Provider Department      11/27/2018 9:15 AM Camron Jc MD [facility]        Today's Diagnoses     Nonrheumatic pulmonary valve stenosis        S/P pulmonary valve replacement           Follow-ups after your visit        Additional Services     Follow-Up with Cardiologist                 Future tests that were ordered for you today     Open Future Orders        Priority Expected Expires Ordered    Echocardiogram Routine 11/27/2019 11/28/2019 11/27/2018    Follow-Up with Cardiologist Routine 11/27/2019 11/28/2019 11/27/2018            Who to contact     If you have questions or need follow up information about today's clinic visit or your schedule please contact [facility] directly at [phone].  Normal or non-critical lab and imaging results will be communicated to you by MyChart, letter or phone within 4 business days after the clinic has received the results. If you do not hear from us within 7 days, please contact the clinic through [employer] or phone. If you have a critical or abnormal lab result, we will notify you by phone as soon as possible.  Submit refill requests through [employer] or call your pharmacy and they will forward the refill request to us. Please allow 3 business days for your refill to be completed.          Additional Information About Your Visit        MyChart Information     [employer] gives you secure access to your electronic health record. If you see a primary care provider, you can also send messages to your care team and make appointments. If you have questions, please call your primary care clinic.  If you do not have a primary care provider, please call [phone] and they will assist you.        Care  "EveryWhere ID     This is your Care EveryWhere ID. This could be used by other organizations to access your [place] medical records  [id number]        Your Vitals Were     Pulse Height Breastfeeding? BMI (Body Mass Index)          60 1.651 m (5' 5\") No 22.61 kg/m2         Blood Pressure from Last 3 Encounters:   11/27/18 100/70   10/03/17 100/66   01/19/17 96/64    Weight from Last 3 Encounters:   11/27/18 61.6 kg (135 lb 14.4 oz)   10/03/17 60.2 kg (132 lb 12.8 oz)   01/19/17 61.1 kg (134 lb 12.8 oz)              We Performed the Following     Follow-Up with Cardiologist        Primary Care Provider Office Phone # Fax #    Julia Helm [phone]       [facility] MEDICAL [facility] [address] [address]        Equal Access to Services     FORD PERKINS : Hadii aad ku hadasho Soomaali, waaxda luqadaha, qaybta kaalmada adeegyada, waxay isabellain hayjesusn farooq lyles . So [facility] [phone].    ATENCIÓN: Si habla español, tiene a bustillo disposición servicios gratuitos de asistencia lingüística. Llame al [phone].    We comply with applicable federal civil rights laws and [place] laws. We do not discriminate on the basis of race, color, national origin, age, disability, sex, sexual orientation, or gender identity.            Thank you!     Thank you for choosing [facility] HEART [facility]  for your care. Our goal is always to provide you with excellent care. Hearing back from our patients is one way we can continue to improve our services. Please take a few minutes to complete the written survey that you may receive in the mail after your visit with us. Thank you!             Your Updated Medication List - Protect others around you: Learn how to safely use, store and throw away your medicines at www.disposemymeds.org.          This list is accurate as of 11/27/18 10:03 AM.  Always use your most recent med list.                   Brand Name Dispense " Instructions for use Diagnosis    AMOXICILLIN PO      Take 1,000 mg by mouth TAKES BEFORE DENTAL VISIT        calcium + D 600-200 MG-UNIT Tabs   Generic drug:  calcium carbonate-vitamin D     0    1 TABLET DAILY    Panhypopituitarism (H)       CORTEF 10 MG tablet   Generic drug:  hydrocortisone     0    1 tab po alternating with 1.5 tabs po qday    Panhypopituitarism (H)       fish oil-omega-3 fatty acids 1000 MG capsule      Take 1 g by mouth daily        LANsoprazole 30 MG DR capsule    PREVACID     Take 30 mg by mouth daily        LORYNA 3-0.02 MG per tablet   Generic drug:  drospirenone-ethinyl estradiol      Take 1 tablet by mouth daily        PROBIOTIC DAILY PO      Take by mouth daily        SYNTHROID 112 MCG tablet   Generic drug:  levothyroxine     0    1 TABLET DAILY    Panhypopituitarism (H)

## 2019-11-04 ENCOUNTER — HEALTH MAINTENANCE LETTER (OUTPATIENT)
Age: 44
End: 2019-11-04

## 2020-01-27 ENCOUNTER — HOSPITAL ENCOUNTER (OUTPATIENT)
Dept: CARDIOLOGY | Facility: CLINIC | Age: 45
Discharge: HOME | End: 2020-01-27
Attending: INTERNAL MEDICINE | Admitting: INTERNAL MEDICINE
Payer: COMMERCIAL

## 2020-01-27 DIAGNOSIS — I37.0 NONRHEUMATIC PULMONARY VALVE STENOSIS: ICD-10-CM

## 2020-01-27 DIAGNOSIS — Z95.2 S/P PULMONARY VALVE REPLACEMENT: ICD-10-CM

## 2020-01-27 PROCEDURE — 93306 TTE W/DOPPLER COMPLETE: CPT | Mod: 26 | Performed by: INTERNAL MEDICINE

## 2020-01-27 PROCEDURE — 93306 TTE W/DOPPLER COMPLETE: CPT

## 2020-02-04 ENCOUNTER — OFFICE VISIT (OUTPATIENT)
Dept: CARDIOLOGY | Facility: CLINIC | Age: 45
End: 2020-02-04
Payer: COMMERCIAL

## 2020-02-04 VITALS
HEIGHT: 65 IN | BODY MASS INDEX: 23.01 KG/M2 | HEART RATE: 68 BPM | DIASTOLIC BLOOD PRESSURE: 74 MMHG | SYSTOLIC BLOOD PRESSURE: 104 MMHG | WEIGHT: 138.1 LBS

## 2020-02-04 DIAGNOSIS — Z95.2 S/P PULMONARY VALVE REPLACEMENT: ICD-10-CM

## 2020-02-04 DIAGNOSIS — I37.0 NONRHEUMATIC PULMONARY VALVE STENOSIS: Primary | ICD-10-CM

## 2020-02-04 PROCEDURE — 99213 OFFICE O/P EST LOW 20 MIN: CPT | Performed by: INTERNAL MEDICINE

## 2020-02-04 ASSESSMENT — MIFFLIN-ST. JEOR: SCORE: 1272.3

## 2020-02-04 NOTE — LETTER
2/4/2020    Pamela Pérez MD  [facility] [address] [address]    RE: Giana Unger       Dear Colleague,    I had the pleasure of seeing Giana Unger in the [facility].    HPI and Plan:   See dictation    Orders Placed This Encounter   Procedures     Follow-Up with Cardiologist     Follow-Up with Cardiologist     Echocardiogram Complete       No orders of the defined types were placed in this encounter.      There are no discontinued medications.      Encounter Diagnoses   Name Primary?     Nonrheumatic pulmonary valve stenosis Yes     S/P pulmonary valve replacement        CURRENT MEDICATIONS:  Current Outpatient Medications   Medication Sig Dispense Refill     AMOXICILLIN PO Take 1,000 mg by mouth TAKES BEFORE DENTAL VISIT       CALCIUM + D 600-200 MG-IU OR TABS 1 TABLET DAILY 0 0     CORTEF 10 MG OR TABS 1 tab po alternating with 1.5 tabs po qday 0 0     drospirenone-ethinyl estradiol (LORYNA) 3-0.02 MG per tablet Take 1 tablet by mouth daily       fish oil-omega-3 fatty acids 1000 MG capsule Take 1 g by mouth daily       LANsoprazole (PREVACID) 30 MG capsule Take 30 mg by mouth daily       Probiotic Product (PROBIOTIC DAILY PO) Take by mouth daily       SYNTHROID 112 MCG OR TABS 1 TABLET DAILY 0 0       ALLERGIES     Allergies   Allergen Reactions     Ancef [Cefazolin] Rash and Hives     Codeine      Nausea/GI upset     Nitrofuran Derivatives Other (See Comments)     nausea and dizziness     Sulfa Drugs Hives       PAST MEDICAL HISTORY:  Past Medical History:   Diagnosis Date     Congenital infundibular pulmonic stenosis      Esophageal reflux      Heart valve replaced 1/12/2006     Problem list name updated by automated process. Provider to review     Low back pain      Osteopenia      Panhypopituitarism (H)      Pituitary adenoma (H) 1986    surgeries x 3     Primary pulmonary hypertension (H)      Pulmonary valve disorders      S/P  pulmonary valve replacement with bioprosthetic valve 2006    and pericardial patch     Sciatica        PAST SURGICAL HISTORY:  Past Surgical History:   Procedure Laterality Date     C ANESTH,UGI ENDOSCOPY  3/04    Dr Bettencourt/ hiatal hernia     C APPENDECTOMY  1999    [place]     C EXCIS PITUITARY,TRANSNASAL/SEPTAL  1988/90/92    adenoma pituitary     C REPAIR PULMONARY ART STEN  1975    5 months     C REPLACEMENT, PULMONARY VALVE  1/3/2006    [place]     ENDOSCOPIC INJECTION/IMPLANT  4/2005    injection into EGD area     HC COLONOSCOPY THRU STOMA, DIAGNOSTIC  3/04      RADIATION THERAPY SPECIAL TREATMENT PROCEDURE  1992    5 weeks to the pituitary area     HC TOOTH EXTRACTION W/FORCEP  age 16    2 teeth surgeries       FAMILY HISTORY:  Family History   Problem Relation Age of Onset     Coronary Artery Disease Sister      Allergies No family hx of      Cancer No family hx of      Diabetes No family hx of      Lipids No family hx of        SOCIAL HISTORY:  Social History     Socioeconomic History     Marital status:      Spouse name: None     Number of children: 0     Years of education: 16     Highest education level: None   Occupational History     Occupation: rn     Employer: [employer]     Comment: Labor and delivery     Employer: [employer]   Social Needs     Financial resource strain: None     Food insecurity:     Worry: None     Inability: None     Transportation needs:     Medical: None     Non-medical: None   Tobacco Use     Smoking status: Never Smoker     Smokeless tobacco: Never Used   Substance and Sexual Activity     Alcohol use: No     Drug use: No     Sexual activity: Not Currently   Lifestyle     Physical activity:     Days per week: None     Minutes per session: None     Stress: None   Relationships     Social connections:     Talks on phone: None     Gets together: None     Attends Buddhism service: None     Active member of club or organization: None     Attends meetings of clubs  "or organizations: None     Relationship status: None     Intimate partner violence:     Fear of current or ex partner: None     Emotionally abused: None     Physically abused: None     Forced sexual activity: None   Other Topics Concern      Service No     Blood Transfusions No     Caffeine Concern No     Comment: 1 coffee a day, maybe 1 soda a week     Occupational Exposure Yes     Hobby Hazards No     Sleep Concern No     Stress Concern No     Weight Concern No     Special Diet No     Back Care No     Exercise No     Bike Helmet No     Seat Belt Yes     Self-Exams No     Parent/sibling w/ CABG, MI or angioplasty before 65F 55M? Not Asked   Social History Narrative     None       Review of Systems:  Skin:  Negative       Eyes:  Negative      ENT:  Negative      Respiratory:  Negative       Cardiovascular:  Negative;palpitations;chest pain;dizziness;lightheadedness;syncope or near-syncope;cyanosis;edema      Gastroenterology: Negative      Genitourinary:  Negative      Musculoskeletal:  Negative      Neurologic:  Negative      Psychiatric:  Negative      Heme/Lymph/Imm:  Negative      Endocrine:  Positive for thyroid disorder      Physical Exam:  Vitals: /74   Pulse 68   Ht 1.651 m (5' 5\")   Wt 62.6 kg (138 lb 1.6 oz)   BMI 22.98 kg/m       Constitutional:  cooperative, alert and oriented, well developed, well nourished, in no acute distress        Skin:  warm and dry to the touch, no apparent skin lesions or masses noted          Head:  normocephalic, no masses or lesions        Eyes:  pupils equal and round        Lymph:No Cervical lymphadenopathy present     ENT:  no pallor or cyanosis, dentition good        Neck:  carotid pulses are full and equal bilaterally, JVP normal, no carotid bruit        Respiratory:  normal breath sounds, clear to auscultation, normal A-P diameter, normal symmetry, normal respiratory excursion, no use of accessory muscles;healed median sternotomy scar         Cardiac: " regular rhythm     crisp prosthetic valve sounds systolic ejection murmur;LUSB   early diastolic murmur;blowing;LUSB;grade 2    pulses full and equal, no bruits auscultated                                        GI:  not assessed this visit        Extremities and Muscular Skeletal:  no deformities, clubbing, cyanosis, erythema observed;no edema              Neurological:  no gross motor deficits;affect appropriate        Psych:  Alert and Oriented x 3        CC  No referring provider defined for this encounter.                Thank you for allowing me to participate in the care of your patient.      Sincerely,     Camron Jc MD, MD     [facility]    cc:   No referring provider defined for this encounter.

## 2020-02-04 NOTE — PROGRESS NOTES
HPI and Plan:   See dictation    Orders Placed This Encounter   Procedures     Follow-Up with Cardiologist     Follow-Up with Cardiologist     Echocardiogram Complete       No orders of the defined types were placed in this encounter.      There are no discontinued medications.      Encounter Diagnoses   Name Primary?     Nonrheumatic pulmonary valve stenosis Yes     S/P pulmonary valve replacement        CURRENT MEDICATIONS:  Current Outpatient Medications   Medication Sig Dispense Refill     AMOXICILLIN PO Take 1,000 mg by mouth TAKES BEFORE DENTAL VISIT       CALCIUM + D 600-200 MG-IU OR TABS 1 TABLET DAILY 0 0     CORTEF 10 MG OR TABS 1 tab po alternating with 1.5 tabs po qday 0 0     drospirenone-ethinyl estradiol (LORYNA) 3-0.02 MG per tablet Take 1 tablet by mouth daily       fish oil-omega-3 fatty acids 1000 MG capsule Take 1 g by mouth daily       LANsoprazole (PREVACID) 30 MG capsule Take 30 mg by mouth daily       Probiotic Product (PROBIOTIC DAILY PO) Take by mouth daily       SYNTHROID 112 MCG OR TABS 1 TABLET DAILY 0 0       ALLERGIES     Allergies   Allergen Reactions     Ancef [Cefazolin] Rash and Hives     Codeine      Nausea/GI upset     Nitrofuran Derivatives Other (See Comments)     nausea and dizziness     Sulfa Drugs Hives       PAST MEDICAL HISTORY:  Past Medical History:   Diagnosis Date     Congenital infundibular pulmonic stenosis      Esophageal reflux      Heart valve replaced 1/12/2006     Problem list name updated by automated process. Provider to review     Low back pain      Osteopenia      Panhypopituitarism (H)      Pituitary adenoma (H) 1986    surgeries x 3     Primary pulmonary hypertension (H)      Pulmonary valve disorders      S/P pulmonary valve replacement with bioprosthetic valve 2006    and pericardial patch     Sciatica        PAST SURGICAL HISTORY:  Past Surgical History:   Procedure Laterality Date     C ANESTH,UGI ENDOSCOPY  3/04    Dr Bettencourt/ hiatal hernia     C  APPENDECTOMY  1999    [facility] EXCIS PITUITARY,TRANSNASAL/SEPTAL  1988/90/92    adenoma pituitary     C REPAIR PULMONARY ART STEN  1975    5 months     C REPLACEMENT, PULMONARY VALVE  1/3/2006    [place]     ENDOSCOPIC INJECTION/IMPLANT  4/2005    injection into EGD area     HC COLONOSCOPY THRU STOMA, DIAGNOSTIC  3/04      RADIATION THERAPY SPECIAL TREATMENT PROCEDURE  1992    5 weeks to the pituitary area     HC TOOTH EXTRACTION W/FORCEP  age 16    2 teeth surgeries       FAMILY HISTORY:  Family History   Problem Relation Age of Onset     Coronary Artery Disease Sister      Allergies No family hx of      Cancer No family hx of      Diabetes No family hx of      Lipids No family hx of        SOCIAL HISTORY:  Social History     Socioeconomic History     Marital status:      Spouse name: None     Number of children: 0     Years of education: 16     Highest education level: None   Occupational History     Occupation: rn     Employer: [employer]     Comment: Labor and delivery     Employer: [employer]   Social Needs     Financial resource strain: None     Food insecurity:     Worry: None     Inability: None     Transportation needs:     Medical: None     Non-medical: None   Tobacco Use     Smoking status: Never Smoker     Smokeless tobacco: Never Used   Substance and Sexual Activity     Alcohol use: No     Drug use: No     Sexual activity: Not Currently   Lifestyle     Physical activity:     Days per week: None     Minutes per session: None     Stress: None   Relationships     Social connections:     Talks on phone: None     Gets together: None     Attends Pentecostalism service: None     Active member of club or organization: None     Attends meetings of clubs or organizations: None     Relationship status: None     Intimate partner violence:     Fear of current or ex partner: None     Emotionally abused: None     Physically abused: None     Forced sexual activity: None   Other Topics Concern      " Service No     Blood Transfusions No     Caffeine Concern No     Comment: 1 coffee a day, maybe 1 soda a week     Occupational Exposure Yes     Hobby Hazards No     Sleep Concern No     Stress Concern No     Weight Concern No     Special Diet No     Back Care No     Exercise No     Bike Helmet No     Seat Belt Yes     Self-Exams No     Parent/sibling w/ CABG, MI or angioplasty before 65F 55M? Not Asked   Social History Narrative     None       Review of Systems:  Skin:  Negative       Eyes:  Negative      ENT:  Negative      Respiratory:  Negative       Cardiovascular:  Negative;palpitations;chest pain;dizziness;lightheadedness;syncope or near-syncope;cyanosis;edema      Gastroenterology: Negative      Genitourinary:  Negative      Musculoskeletal:  Negative      Neurologic:  Negative      Psychiatric:  Negative      Heme/Lymph/Imm:  Negative      Endocrine:  Positive for thyroid disorder      Physical Exam:  Vitals: /74   Pulse 68   Ht 1.651 m (5' 5\")   Wt 62.6 kg (138 lb 1.6 oz)   BMI 22.98 kg/m      Constitutional:  cooperative, alert and oriented, well developed, well nourished, in no acute distress        Skin:  warm and dry to the touch, no apparent skin lesions or masses noted          Head:  normocephalic, no masses or lesions        Eyes:  pupils equal and round        Lymph:No Cervical lymphadenopathy present     ENT:  no pallor or cyanosis, dentition good        Neck:  carotid pulses are full and equal bilaterally, JVP normal, no carotid bruit        Respiratory:  normal breath sounds, clear to auscultation, normal A-P diameter, normal symmetry, normal respiratory excursion, no use of accessory muscles;healed median sternotomy scar         Cardiac: regular rhythm     crisp prosthetic valve sounds systolic ejection murmur;LUSB   early diastolic murmur;blowing;LUSB;grade 2    pulses full and equal, no bruits auscultated                                        GI:  not assessed this visit   "      Extremities and Muscular Skeletal:  no deformities, clubbing, cyanosis, erythema observed;no edema              Neurological:  no gross motor deficits;affect appropriate        Psych:  Alert and Oriented x 3        CC  No referring provider defined for this encounter.

## 2020-02-04 NOTE — PROGRESS NOTES
Service Date: 02/04/2020      CARDIOLOGY FOLLOWUP VISIT      REFERRING PHYSICIAN:  Dr. Pamela Pérez      HISTORY OF PRESENT ILLNESS:  It is my pleasure to see your patient, Giana Unger.  As you know, Giana Unger is a 44-year-old patient with congenital heart disease.  This patient had infundibular and pulmonary valvular stenosis.  She had this repaired as a child.  She then developed severe pulmonary regurgitation with evidence of right ventricular enlargement and right ventricular dysfunction and then I referred the patient to Dr. Kayley Villegas who implanted a bioprosthetic aortic valve.  The earliest echo we have is from 2008 which was 2 years after the surgery and this showed mild pulmonary regurgitation.  The echo in 2009 showed mild-to-moderate bioprosthetic pulmonary regurgitation and ever since that time, the degree of regurgitation has been called mild to moderate or moderate.  The echocardiogram which was performed approximately a week ago shows moderate pulmonary regurgitation, though looking at it myself, it looks more as a mild-to-moderate pulmonary regurgitation.  The mean gradient across the pulmonary valve is 14 mmHg with a maximum instantaneous gradient of 26 mmHg and this is the same as it was in 2018 when the mean gradient again was 14 mmHg and a maximum instantaneous gradient of 23 mmHg.  In 2016, showed a maximum instantaneous gradient of 31 mmHg.  A mean gradient was not checked.  In 2014, the mean gradient was 16 mmHg with a maximum instantaneous gradient of 29 mmHg.  So, in summary, the valve gradients have been stable and the degree of regurgitation appears to have been stable over that span of time.  Importantly, the right ventricular size and function are both normal.  The patient is entirely asymptomatic.  She does use antibiotic prophylaxis for any dental or surgical procedures.  Her blood pressure is normal today at 104/74.      IMPRESSION:  Bioprosthetic aortic valve.  The  patient has mild-to-moderate to moderate bioprosthetic valve regurgitation.  Gradients have been stable over the years.  Right ventricular size and function are both normal and the patient is asymptomatic.  The patient does use antibiotic prophylaxis for any dental or surgical procedures.      PLAN:  I think it is worthwhile that Ms. Burch sees one of our congenital heart disease cardiologists, at least for a second opinion on her congenital heart condition.  I will arrange that for her in the next 3 months.  I will have the patient up and see me in a year's time but I would value Dr. Conrad's opinion on the valve.      Many thanks for allowing me to be involved in the care of this nice patient.      Camron Ramey MD, FACC       cc:   Pamela [place] [address]         CAMRON RAMEY MD, FACC             D: 2020   T: 2020   MT: DEVON      Name:     JEREL BURCH   MRN:      [id number]        Account:      [id number]   :      1975           Service Date: 2020      Document: [id number]

## 2020-02-04 NOTE — LETTER
2/4/2020      Pamela Pérez MD  [facility] [address] [address]      RE: Giana Unger       Dear Colleague,    I had the pleasure of seeing Giana Unger in the [facility].    Service Date: 02/04/2020      CARDIOLOGY FOLLOWUP VISIT      REFERRING PHYSICIAN:  Dr. Pamela Pérez      HISTORY OF PRESENT ILLNESS:  It is my pleasure to see your patient, Giana Unger.  As you know, Gaina Unger is a 44-year-old patient with congenital heart disease.  This patient had infundibular and pulmonary valvular stenosis.  She had this repaired as a child.  She then developed severe pulmonary regurgitation with evidence of right ventricular enlargement and right ventricular dysfunction and then I referred the patient to Dr. Kayley Villegas who implanted a bioprosthetic aortic valve.  The earliest echo we have is from 2008 which was 2 years after the surgery and this showed mild pulmonary regurgitation.  The echo in 2009 showed mild-to-moderate bioprosthetic pulmonary regurgitation and ever since that time, the degree of regurgitation has been called mild to moderate or moderate.  The echocardiogram which was performed approximately a week ago shows moderate pulmonary regurgitation, though looking at it myself, it looks more as a mild-to-moderate pulmonary regurgitation.  The mean gradient across the pulmonary valve is 14 mmHg with a maximum instantaneous gradient of 26 mmHg and this is the same as it was in 2018 when the mean gradient again was 14 mmHg and a maximum instantaneous gradient of 23 mmHg.  In 2016, showed a maximum instantaneous gradient of 31 mmHg.  A mean gradient was not checked.  In 2014, the mean gradient was 16 mmHg with a maximum instantaneous gradient of 29 mmHg.  So, in summary, the valve gradients have been stable and the degree of regurgitation appears to have been stable over that span of time.  Importantly, the right  ventricular size and function are both normal.  The patient is entirely asymptomatic.  She does use antibiotic prophylaxis for any dental or surgical procedures.  Her blood pressure is normal today at 104/74.      IMPRESSION:  Bioprosthetic aortic valve.  The patient has mild-to-moderate to moderate bioprosthetic valve regurgitation.  Gradients have been stable over the years.  Right ventricular size and function are both normal and the patient is asymptomatic.  The patient does use antibiotic prophylaxis for any dental or surgical procedures.      PLAN:  I think it is worthwhile that Ms. Burch sees one of our congenital heart disease cardiologists, at least for a second opinion on her congenital heart condition.  I will arrange that for her in the next 3 months.  I will have the patient up and see me in a year's time but I would value Dr. Conrad's opinion on the valve.      Many thanks for allowing me to be involved in the care of this nice patient.      Camron Ramey MD, FACC       cc:   Pamela [place] [address]         CAMRON RAMEY MD, FACC             D: 2020   T: 2020   MT: DW      Name:     JEREL BURCH   MRN:      [id number]        Account:      [id number]   :      1975           Service Date: 2020      Document: [id number]         Outpatient Encounter Medications as of 2020   Medication Sig Dispense Refill     AMOXICILLIN PO Take 1,000 mg by mouth TAKES BEFORE DENTAL VISIT       CALCIUM + D 600-200 MG-IU OR TABS 1 TABLET DAILY 0 0     CORTEF 10 MG OR TABS 1 tab po alternating with 1.5 tabs po qday 0 0     drospirenone-ethinyl estradiol (LORYNA) 3-0.02 MG per tablet Take 1 tablet by mouth daily       fish oil-omega-3 fatty acids 1000 MG capsule Take 1 g by mouth daily       LANsoprazole (PREVACID) 30 MG capsule Take 30 mg by mouth daily       Probiotic Product (PROBIOTIC DAILY PO) Take by  mouth daily       SYNTHROID 112 MCG OR TABS 1 TABLET DAILY 0 0     No facility-administered encounter medications on file as of 2/4/2020.        Again, thank you for allowing me to participate in the care of your patient.      Sincerely,    Camron Jc MD, MD     [facility]

## 2020-02-24 ENCOUNTER — TELEPHONE (OUTPATIENT)
Dept: CARDIOLOGY | Facility: CLINIC | Age: 45
End: 2020-02-24

## 2020-02-24 NOTE — TELEPHONE ENCOUNTER
1st attempt to reach out to patient to schedule an appointment with Dr. Conrad. No answer, left  with callback number.   [phone]

## 2020-04-08 NOTE — PROGRESS NOTES
"Giana Unger is a 44 year old female who is being evaluated via a billable video visit.      The patient has been notified of following:     \"This video visit will be conducted via a call between you and your physician/provider. We have found that certain health care needs can be provided without the need for an in-person physical exam.  This service lets us provide the care you need with a video conversation.  If a prescription is necessary we can send it directly to your pharmacy.  If lab work is needed we can place an order for that and you can then stop by our lab to have the test done at a later time.    Video visits are billed at different rates depending on your insurance coverage.  Please reach out to your insurance provider with any questions.    If during the course of the call the physician/provider feels a video visit is not appropriate, you will not be charged for this service.\"    Patient has given verbal consent for Video visit? Yes    How would you like to obtain your AVS? Mail a copy    Patient would like the video invitation sent by: Send to e-mail at: [email or web address]  {If patient encounters technical issues they should call [phone] :052412}    Video Start Time: {video visit start time:152948}    Giana Unger complains of    Chief Complaint   Patient presents with     Congenital Consult     congenital infundibular pulmonary stenosis       I have reviewed and updated the patient's Past Medical History, Social History, Family History and Medication List.    ALLERGIES  Ancef [cefazolin]; Codeine; Nitrofuran derivatives; and Sulfa drugs     VITALS REPORTED BY PATIENT:    No weight or BP taken at home.    JAMAICA Goodman    Additional provider notes: {insert note template:814646} ***      Video-Visit Details    Type of service:  Video Visit    Video End Time (time video stopped): ***    Originating Location (pt. Location): {video visit patient location:032368::\"Home\"}    Distant Location " (provider location):  [facility]     Mode of Communication:  Video Conference via [employer]      {signature options:147734}    CARDIOLOGY CONSULTATION:    Please see dictated note    PAST MEDICAL HISTORY:  Past Medical History:   Diagnosis Date     Congenital infundibular pulmonic stenosis      Esophageal reflux      Heart valve replaced 1/12/2006     Problem list name updated by automated process. Provider to review     Low back pain      Osteopenia      Panhypopituitarism (H)      Pituitary adenoma (H) 1986    surgeries x 3     Primary pulmonary hypertension (H)      Pulmonary valve disorders      S/P pulmonary valve replacement with bioprosthetic valve 2006    and pericardial patch     Sciatica        CURRENT MEDICATIONS:  Current Outpatient Medications   Medication Sig Dispense Refill     AMOXICILLIN PO Take 1,000 mg by mouth TAKES BEFORE DENTAL VISIT       CALCIUM + D 600-200 MG-IU OR TABS 1 TABLET DAILY 0 0     CORTEF 10 MG OR TABS 1 tab po alternating with 1.5 tabs po qday 0 0     drospirenone-ethinyl estradiol (LORYNA) 3-0.02 MG per tablet Take 1 tablet by mouth daily       fish oil-omega-3 fatty acids 1000 MG capsule Take 1 g by mouth daily       LANsoprazole (PREVACID) 30 MG capsule Take 30 mg by mouth daily       Probiotic Product (PROBIOTIC DAILY PO) Take by mouth daily       SYNTHROID 112 MCG OR TABS 1 TABLET DAILY 0 0       PAST SURGICAL HISTORY:  Past Surgical History:   Procedure Laterality Date     C ANESTH,UGI ENDOSCOPY  3/04    Dr Bettencourt/ hiatal hernia     C APPENDECTOMY  1999    [place]     C EXCIS PITUITARY,TRANSNASAL/SEPTAL  1988/90/92    adenoma pituitary     C REPAIR PULMONARY ART STEN  1975    5 months     C REPLACEMENT, PULMONARY VALVE  1/3/2006    [place]     ENDOSCOPIC INJECTION/IMPLANT  4/2005    injection into EGD area     HC COLONOSCOPY THRU STOMA, DIAGNOSTIC  3/04      RADIATION THERAPY SPECIAL TREATMENT PROCEDURE  1992    5 weeks to the pituitary  area     HC TOOTH EXTRACTION W/FORCEP  age 16    2 teeth surgeries       ALLERGIES  Ancef [cefazolin]; Codeine; Nitrofuran derivatives; and Sulfa drugs    FAMILY HX:  Family History   Problem Relation Age of Onset     Coronary Artery Disease Sister      Allergies No family hx of      Cancer No family hx of      Diabetes No family hx of      Lipids No family hx of        SOCIAL HX:  Social History     Socioeconomic History     Marital status:      Spouse name: Not on file     Number of children: 0     Years of education: 16     Highest education level: Not on file   Occupational History     Occupation: rn     Employer: [employer]     Comment: Labor and delivery     Employer: [employer]   Social Needs     Financial resource strain: Not on file     Food insecurity     Worry: Not on file     Inability: Not on file     Transportation needs     Medical: Not on file     Non-medical: Not on file   Tobacco Use     Smoking status: Never Smoker     Smokeless tobacco: Never Used   Substance and Sexual Activity     Alcohol use: No     Drug use: No     Sexual activity: Not Currently   Lifestyle     Physical activity     Days per week: Not on file     Minutes per session: Not on file     Stress: Not on file   Relationships     Social connections     Talks on phone: Not on file     Gets together: Not on file     Attends [employer]ology service: Not on file     Active member of club or organization: Not on file     Attends meetings of clubs or organizations: Not on file     Relationship status: Not on file     Intimate partner violence     Fear of current or ex partner: Not on file     Emotionally abused: Not on file     Physically abused: Not on file     Forced sexual activity: Not on file   Other Topics Concern      Service No     Blood Transfusions No     Caffeine Concern No     Comment: 1 coffee a day, maybe 1 soda a week     Occupational Exposure Yes     Hobby Hazards No     Sleep Concern No     Stress  Concern No     Weight Concern No     Special Diet No     Back Care No     Exercise No     Bike Helmet No     Seat Belt Yes     Self-Exams No     Parent/sibling w/ CABG, MI or angioplasty before 65F 55M? Not Asked   Social History Narrative     Not on file       ROS:  Constitutional: No fever, chills, or sweats. No weight gain/loss.   ENT: No visual disturbance, ear ache, epistaxis, sore throat.   Allergies/Immunologic: Negative.   Respiratory: No cough, hemoptysis.   Cardiovascular: As per HPI.   GI: No nausea, vomiting, hematemesis, melena, or hematochezia.   : No urinary frequency, dysuria, or hematuria.   Integument: Negative.   Psychiatric: Negative.   Neuro: Negative.   Endocrinology: Negative.   Musculoskeletal: No myalgia.    VITAL SIGNS:  There were no vitals taken for this visit.  There is no height or weight on file to calculate BMI.  Wt Readings from Last 2 Encounters:   02/04/20 62.6 kg (138 lb 1.6 oz)   11/27/18 61.6 kg (135 lb 14.4 oz)       PHYSICAL EXAM  Giana Unger IS A 44 year old female.in no acute distress.  HEENT: Unremarkable.    Extremities: No C/C/E. Neuro: Grossly intact.    LABS    Lab Results   Component Value Date    WBC 15.5 01/12/2006     Lab Results   Component Value Date    RBC 4.52 01/12/2006     Lab Results   Component Value Date    HGB 11.9 01/12/2006     Lab Results   Component Value Date    HCT 37.8 01/12/2006     No components found for: MCT  Lab Results   Component Value Date    MCV 83.5 01/12/2006     Lab Results   Component Value Date    MCH 26.4 01/12/2006     Lab Results   Component Value Date    MCHC 31.5 01/12/2006     No results found for: RDW  Lab Results   Component Value Date     01/12/2006      CINDY Conrad MD

## 2020-04-09 ENCOUNTER — TELEPHONE (OUTPATIENT)
Dept: CARDIOLOGY | Facility: CLINIC | Age: 45
End: 2020-04-09

## 2020-04-09 ENCOUNTER — VIRTUAL VISIT (OUTPATIENT)
Dept: CARDIOLOGY | Facility: CLINIC | Age: 45
End: 2020-04-09
Attending: INTERNAL MEDICINE
Payer: COMMERCIAL

## 2020-04-09 DIAGNOSIS — Z95.2 S/P PULMONARY VALVE REPLACEMENT: ICD-10-CM

## 2020-04-09 DIAGNOSIS — I37.0 NONRHEUMATIC PULMONARY VALVE STENOSIS: ICD-10-CM

## 2020-04-09 PROCEDURE — 99204 OFFICE O/P NEW MOD 45 MIN: CPT | Mod: GT | Performed by: INTERNAL MEDICINE

## 2020-04-09 NOTE — TELEPHONE ENCOUNTER
Giana called to update Dr. Conrad regarding some questions that she asked at the visit that at the time patient did not know the answer too.    Dr. Conrad asked pt is she knew who the surgeon was who did her surgery at 5 months old and her parents do not remember the name of the provider, so she does not have that information.    She did find out that she does have a ASD that was repaired, she mentions that it was not severe enough that they would have done a repair on its own, but since they had already gone in for the pulmonary valve stenosis they repaired the ASD also.     She also mentions that her mother does have bicuspid regurgitation and her sister has mild pulmonary stenosis.

## 2020-04-09 NOTE — PATIENT INSTRUCTIONS
You were seen today in the Adult Congenital and Cardiovascular Genetics Clinic at the [facility].    Cardiology Providers you saw during your visit:  CINDY Conrad MD    Diagnosis:  Congenital infundibular pulmonary stenosis    Results:  CINDY Conrad MD reviewed the results of your echocardiogram testing today in clinic.    Recommendations:    1. Continue to eat a heart healthy, low salt diet.  2. Continue to get 20-30 minutes of aerobic activity, 4-5 days per week.  Examples of aerobic activity include walking, running, swimming, cycling, etc.  3. Continue to observe good oral hygiene, with regular dental visits.  4. Begin taking a baby aspirin, 81 mg daily.  5. We will obtain the operative notes from [facility] in [place], [place] (2/1975) and from [facility] (1/3/2006).    SBE prophylaxis:   Yes_X__  No____    Lifelong Bacterial Endocarditis Prophylaxis:  YES____  NO____    If YES is checked, follow the recommendations outlined below:  1. Take antibiotic(s) prior to recommended dental procedures and procedures on the respiratory tract or with infected skin, muscle or bones. SBE prophylaxis is not needed for routine GI and  procedures (ie. Colonoscopy or vaginal delivery)  2. Observe good oral hygiene daily, as advised by your dentist. Get regular professional dental care.  3. Keep cuts clean.  4. Infections should be treated promptly.  5. Symptoms of Infective Endocarditis could include: fever lasting more than 4-5 days or a recurrent fever that initially resolves but returns within 1-2 days)      Exercise restrictions:   Yes__X__  No____         If yes, list restrictions:  Must be allowed to rest if fatigued or SOB      Work restrictions:  Yes____  No_X___         If yes, list restrictions:    FASTING CHOLESTEROL was checked in the last 5 years YES_X__  NO___ (2019)  Continue to eat a heart healthy, low salt diet.         ____ Fasting lipid panel order today         ____ No changes in  medications          ____ I recommend the following changes in your cholesterol medications.:          ____ Please follow up for cholesterol screening at your primary care physician      Follow-up:  Follow up with Dr. Conrad in the next few months with an EKG after having a MRA/cMRI to assess RV volumes.    If you have questions or concerns please contact us at:    Giana Saldivar, MSN, RN, CNL    Eli Kumari (Scheduling)  Nurse Care Coordinator     Clinic   Adult Congenital and CV Genetics   Adult Congenital and CV Genetic  [facility]   [facility]  (P) [phone]     (P) [phone]  [email or web address]   (F) [phone]        For after hours urgent needs, call [phone] and ask to speak to the Adult Congenital Physician on call.  Mention Job Code 0401.    For emergencies call 911.    [facility]   Clinics and [facility] Center  Mail Code [id number]  [address] [place]  [address]

## 2020-04-09 NOTE — PROGRESS NOTES
Service Date: 04/09/2020      VIDEO VISIT       HISTORY OF PRESENT ILLNESS:  Ms. Unger is a delightful, 44-year-old woman whom I am meeting for the first time via video visit.  She has followed with her excellent cardiologist, Dr. Yovanny Ramey, who has kindly referred her to the Adult Congenital Clinic.      Ms. Unger has a past medical history significant for infundibular pulmonary valvular stenosis.  Some of the records indicate that she also had an atrial septal defect, and there is one record that suggests that she had tetralogy of Fallot.  She does not know any of these details, and at this time we do not have the operative report, but I do note that on her EKG from 2006 that was done at [facility], she did not have a right bundle branch block, so it suggests she was not a Tet.  Her initial surgery was at 5 months of age in [place] at [facility] in [place], and we are working on getting that operative report.  She followed with Dr. Yovanny Ramey, and due to progressive right ventricular enlargement and dysfunction, she was referred to [facility] in 2006 and underwent valve replacement with a 25 mm Megan-Smith valve and pericardial patch enlargement on 01/03/2006 with a postoperative course that was not complicated.      Ms. Unger has been doing reasonably well from the standpoint of her valve.  She has just mild to moderate pulmonary valve insufficiency.  I reviewed those images personally, and she has no significant valvular stenosis.  On her most recent echo from 01/27/2020, she had a mean gradient across her pulmonary valve of 14.  Her right ventricle is at most mildly enlarged.  She has not had a cardiac MRI.  She denies any palpitations or racing heart.  She has exercised regularly, and before the COVID-19 restrictions, she was exercising at the [facility].  She is currently walking and again feels well.  She is an OB nurse.  She has an 11-year-old son and a 5-year-old daughter, and she  is also taking care of an 11-year-old boy from [place] this year.  Ms. Unger has no family history of congenital heart disease.  She has 4 siblings.  No early coronary artery disease.  She did have a pituitary adenoma that was diagnosed at 12 years of age.  She underwent surgery at 12, 15 and 16 years of age, and at 16, she also received radiation with no subsequent surgeries.  She is on estrogen replacement, Synthroid and cortisone 10 mg alternating with 15 mg, followed closely by an endocrinologist.  She has had appropriate followup for this condition, including bone mineral density.      Ms. Unger has not been on a baby aspirin.  She does not recall ever being told that she should be.  She does take amoxicillin before the dentist.  Her lipids not on treatment are reasonable.  Her most recent in 10/2019 showed an LDL of 137 with an HDL of 65.  Her A1c is also being followed closely.  It was 6.1 in 10/2019.      IMPRESSION, REPORT, PLAN:   1.  Infundibular pulmonary valve stenosis, status post initial surgery at 5 months of age at [facility] in [place], [place].  Working on getting this operative report.   2.  Progression to severe pulmonary valve regurgitation with RV enlargement, status post pulmonary valve replacement on 01/03/2006 by Dr. Villegas at [facility] with a 25 mm Megan-Smith valve and pericardial patch enlargement.  Working on getting this operative report.   3.  At most mild right ventricular enlargement with mild to moderate pulmonary valve regurgitation and no significant stenosis, stable.   4.  History of a pituitary adenoma, status post surgery at 12, 15 and 16 years of age with radiation at 16 years of age, followed closely by Endocrinology.  On cortisone, estrogen and Synthroid.   5.  Mild hyperlipidemia with an LDL of 137 and an HDL of 65, not on treatment.   6.  Borderline A1c in 10/2019 at 6.1, followed by Endocrinology.      DISCUSSION:  It was a pleasure being involved  in the care of Ms. Unger.  I discussed her history and symptoms in detail and reviewed all of her testing as outlined.  I appreciate the opportunity to see her for Dr. Yovanny Ramey.  I reviewed her images, and I agree she has at most moderate pulmonary valve insufficiency, and looking at the images, it has been stable.  She just has mild stenosis, and this has also been stable.  Her valve is now going on its 16th year, so this is a good sign.  We discussed today it is unpredictable to know when it will get to the point that we need to do something again, but at least her parameters at this time are reassuring.  I would recommend that she be on a baby aspirin a day.  She is currently taking antibiotics before the dentist, and she knows to continue to do that.  She is not having any concerning symptoms.  No palpitations and no limitations.  I do think it is reasonable to get a baseline cardiac MRI/MRA.  This is especially in light of the fact that there was a question of whether or not she had an ASD and maybe some form of tetralogy.  The operative reports will also help with that, but the MRI will also give us RV volumes so that we will know where she is at baseline if things start to worsen or she starts to become more symptomatic.  We will plan to do that in the next few months.  She understands and is in agreement with the plan as outlined.  All questions were answered.        It was a pleasure to see her.  We will plan to do an EKG as well when she returns.         LIZET ROSSI MD             D: 2020   T: 2020   MT: rusty      Name:     JEREL UNGER   MRN:      [id number]        Account:      [id number]   :      1975           Service Date: 2020      Document: [id number]

## 2020-04-10 ENCOUNTER — CARE COORDINATION (OUTPATIENT)
Dept: CARDIOLOGY | Facility: CLINIC | Age: 45
End: 2020-04-10

## 2020-09-04 ENCOUNTER — DOCUMENTATION ONLY (OUTPATIENT)
Dept: CARDIOLOGY | Facility: CLINIC | Age: 45
End: 2020-09-04

## 2020-09-04 NOTE — PROGRESS NOTES
Documentation received from [facility] that they do not have any records on file anymore for this patient. Records are destroyed after 25 years.

## 2020-09-11 ENCOUNTER — HOSPITAL ENCOUNTER (OUTPATIENT)
Dept: MRI IMAGING | Facility: CLINIC | Age: 45
End: 2020-09-11
Attending: INTERNAL MEDICINE
Payer: COMMERCIAL

## 2020-09-11 ENCOUNTER — OFFICE VISIT (OUTPATIENT)
Dept: CARDIOLOGY | Facility: CLINIC | Age: 45
End: 2020-09-11
Attending: INTERNAL MEDICINE
Payer: COMMERCIAL

## 2020-09-11 VITALS
HEIGHT: 65 IN | HEART RATE: 66 BPM | SYSTOLIC BLOOD PRESSURE: 116 MMHG | BODY MASS INDEX: 22.49 KG/M2 | WEIGHT: 135 LBS | OXYGEN SATURATION: 100 % | DIASTOLIC BLOOD PRESSURE: 70 MMHG

## 2020-09-11 DIAGNOSIS — I37.0 NONRHEUMATIC PULMONARY VALVE STENOSIS: ICD-10-CM

## 2020-09-11 DIAGNOSIS — Z95.2 S/P PULMONARY VALVE REPLACEMENT: ICD-10-CM

## 2020-09-11 PROCEDURE — 99214 OFFICE O/P EST MOD 30 MIN: CPT | Mod: ZP | Performed by: INTERNAL MEDICINE

## 2020-09-11 PROCEDURE — 25500064 ZZH RX 255 OP 636: Performed by: INTERNAL MEDICINE

## 2020-09-11 PROCEDURE — 75561 CARDIAC MRI FOR MORPH W/DYE: CPT

## 2020-09-11 PROCEDURE — 75565 CARD MRI VELOC FLOW MAPPING: CPT | Mod: 26 | Performed by: INTERNAL MEDICINE

## 2020-09-11 PROCEDURE — 71555 MRI ANGIO CHEST W OR W/O DYE: CPT

## 2020-09-11 PROCEDURE — 75561 CARDIAC MRI FOR MORPH W/DYE: CPT | Mod: 26 | Performed by: INTERNAL MEDICINE

## 2020-09-11 PROCEDURE — G0463 HOSPITAL OUTPT CLINIC VISIT: HCPCS | Mod: ZF

## 2020-09-11 PROCEDURE — A9585 GADOBUTROL INJECTION: HCPCS | Performed by: INTERNAL MEDICINE

## 2020-09-11 RX ORDER — GADOBUTROL 604.72 MG/ML
7.5 INJECTION INTRAVENOUS ONCE
Status: COMPLETED | OUTPATIENT
Start: 2020-09-11 | End: 2020-09-11

## 2020-09-11 RX ADMIN — GADOBUTROL 7.5 ML: 604.72 INJECTION INTRAVENOUS at 10:25

## 2020-09-11 ASSESSMENT — PAIN SCALES - GENERAL: PAINLEVEL: NO PAIN (0)

## 2020-09-11 ASSESSMENT — MIFFLIN-ST. JEOR: SCORE: 1258.24

## 2020-09-11 NOTE — NURSING NOTE
Cardiac Testing: Patient given instructions regarding  echocardiogram . Discussed purpose, preparation, procedure and when to expect results reported back to the patient. Patient demonstrated understanding of this information and agreed to call with further questions or concerns.    Labs:  Patient was instructed to return for the next laboratory testing in 1 year . Patient demonstrated understanding of this information and agreed to call with further questions or concerns.     Med Reconcile: Reviewed and verified all current medications with the patient. The updated medication list was printed and given to the patient.    Return Appointment: Follow up with Dr Conrad in one year with an echocardiogram and labs.  Patient given instructions regarding scheduling next clinic visit. Patient demonstrated understanding of this information and agreed to call with further questions or concerns.    Patient stated she understood all health information given and agreed to call with further questions or concerns.    Harish Ewing, RN  Cardiology RN Care Coordinator  [phone]

## 2020-09-11 NOTE — PATIENT INSTRUCTIONS
You were seen today in the Adult Congenital and Cardiovascular Genetics Clinic at the [facility].    Cardiology Providers you saw during your visit:  Dr Duane Conrad    Diagnosis:  History of infundibular pulmonary stenosis status post pulmonary valve replacement    Results:  We will call you with the final results of your MRI/MRA of your chest    Recommendations:    1.  Continue to eat a heart healthy, low salt diet.  2.  Continue to get 20-30 minutes of aerobic activity, 4-5 days per week.  Examples of aerobic activity include walking, running, swimming, cycling, etc.  3.  Continue to observe good oral hygiene, with regular dental visits.    SBE prophylaxis:   Yes__x__  No____    Lifelong Bacterial Endocarditis Prophylaxis:  YES____  NO____    If YES is checked, follow the recommendations outlined below:   1. Take antibiotic(s) prior to recommended dental procedures and procedures on the respiratory tract or with infected skin, muscle or bones. SBE prophylaxis is not needed for routine GI and  procedures (ie. Colonoscopy or vaginal delivery)   2. Observe good oral hygiene daily, as advised by your dentist. Get regular professional dental care.   3. Keep cuts clean.   4. Infections should be treated promptly.   5. Symptoms of Infective Endocarditis could include: fever lasting more than 4-5 days or a recurrent fever that initially resolves but returns within 1-2 days)     Exercise restrictions:   Yes____  No__x__         If yes, list restrictions:  Must be allowed to rest if fatigued or SOB      Work restrictions:  Yes____  No__x__         If yes, list restrictions:    FASTING CHOLESTEROL was checked in the last 5 years YES_x__  NO___  2019  Continue to eat a heart healthy, low salt diet.         ____ Fasting lipid panel order today         ____ No changes in medications          ____ I recommend the following changes in your cholesterol medications.:          ____ Please follow up for cholesterol  screening at your primary care physician      Follow-up:  Follow up Dr Conrad in one year with an echocardiogram and labs      For after hours urgent needs, call [phone] and ask to speak to the Adult Congenital Physician on call.  Mention Job Code 0401.    For emergencies call [phone].    For any scheduling needs, please call Eli Kumari Procedure , at [phone]  Thank you for your visit today!  If you have questions or concerns about today's visit, please call me.    Harish Ewing RN, BSN  Cardiology Care Coordinator  [facility]  [phone]    [address] [facility]  Mail Code [id number]  [place] [address]

## 2020-09-11 NOTE — PROGRESS NOTES
CARDIOLOGY CONSULTATION:    Ms. Unger is a delightful, 44-year-old woman that I met in April of this year.      Ms. Unger has a past medical history significant for infundibular pulmonary valvular stenosis.   Her initial surgery was at 5 months of age in [place] at [facility] in [place], and we are working on getting that operative report.  She followed with Dr. Yovanny Ramey, and due to progressive right ventricular enlargement and dysfunction, she was referred to [facility] in 2006 and underwent valve replacement with a 25 mm Megan-Smith valve and pericardial patch enlargement on 01/03/2006 with a postoperative course that was not complicated.      Ms. Unger has been doing reasonably well from the standpoint of her valve.  She has just mild to moderate pulmonary valve insufficiency.  I reviewed those images personally, and she has no significant valvular stenosis.  On her most recent echo from 01/27/2020, she had a mean gradient across her pulmonary valve of 14.  Her right ventricle is at most mildly enlarged.    She is an OB nurse.  She has an 11-year-old son and a 5-year-old daughter, and she is also taking care of an 11-year-old boy from [facility] this year.  Ms. Unger has no family history of congenital heart disease.  She has 4 siblings.  No early coronary artery disease. She did have a pituitary adenoma that was diagnosed at 12 years of age.  She underwent surgery at 12, 15 and 16 years of age, and at 16, she also received radiation with no subsequent surgeries. She is on estrogen replacement, Synthroid and cortisone 10 mg alternating with 15 mg, followed closely by an endocrinologist. She has had appropriate followup for this condition, including bone mineral density.      Ms. Unger was not on a baby aspirin when I met her, she is now taking that. She does take antibiotics before the dentist.  Last A1C in October was 6.1 and last LDL was 137 with HDL 65.  She is going to have labs  with her primary in the near future.    Cardiac MRI today report is pending; preview of the images, however, shows the RV to be of good size and no significant issues.    PAST MEDICAL HISTORY:  Past Medical History:   Diagnosis Date     Congenital infundibular pulmonic stenosis      Esophageal reflux      Heart valve replaced 1/12/2006     Problem list name updated by automated process. Provider to review     Low back pain      Osteopenia      Panhypopituitarism (H)      Pituitary adenoma (H) 1986    surgeries x 3     Primary pulmonary hypertension (H)      Pulmonary valve disorders      S/P pulmonary valve replacement with bioprosthetic valve 2006    and pericardial patch     Sciatica        CURRENT MEDICATIONS:  Current Outpatient Medications   Medication Sig Dispense Refill     AMOXICILLIN PO Take 1,000 mg by mouth TAKES BEFORE DENTAL VISIT       aspirin (ASA) 81 MG tablet Take 1 tablet (81 mg) by mouth daily 90 tablet 3     CALCIUM + D 600-200 MG-IU OR TABS 1 TABLET DAILY 0 0     CORTEF 10 MG OR TABS 1 tab po alternating with 1.5 tabs po qday 0 0     drospirenone-ethinyl estradiol (LORYNA) 3-0.02 MG per tablet Take 1 tablet by mouth daily       fish oil-omega-3 fatty acids 1000 MG capsule Take 1 g by mouth daily       LANsoprazole (PREVACID) 30 MG capsule Take 30 mg by mouth daily       Probiotic Product (PROBIOTIC DAILY PO) Take by mouth daily       SYNTHROID 112 MCG OR TABS 1 TABLET DAILY 0 0       PAST SURGICAL HISTORY:  Past Surgical History:   Procedure Laterality Date     C ANESTH,UGI ENDOSCOPY  3/04    Dr Bettencourt/ hiatal hernia     C APPENDECTOMY  1999    [place]     C EXCIS PITUITARY,TRANSNASAL/SEPTAL  1988/90/92    adenoma pituitary     C REPAIR PULMONARY ART STEN  1975    5 months     C REPLACEMENT, PULMONARY VALVE  1/3/2006    [place]     ENDOSCOPIC INJECTION/IMPLANT  4/2005    injection into EGD area     HC COLONOSCOPY THRU STOMA, DIAGNOSTIC  3/04      RADIATION THERAPY SPECIAL TREATMENT PROCEDURE  1992     5 weeks to the pituitary area     HC TOOTH EXTRACTION W/FORCEP  age 16    2 teeth surgeries       ALLERGIES  Ancef [cefazolin]; Codeine; Nitrofuran derivatives; and Sulfa drugs    FAMILY HX:  Family History   Problem Relation Age of Onset     Coronary Artery Disease Sister      Allergies No family hx of      Cancer No family hx of      Diabetes No family hx of      Lipids No family hx of        SOCIAL HX:  Social History     Socioeconomic History     Marital status:      Spouse name: None     Number of children: 0     Years of education: 16     Highest education level: None   Occupational History     Occupation: rn     Employer: [employer]     Comment: Labor and delivery     Employer: [employer]   Social Needs     Financial resource strain: None     Food insecurity     Worry: None     Inability: None     Transportation needs     Medical: None     Non-medical: None   Tobacco Use     Smoking status: Never Smoker     Smokeless tobacco: Never Used   Substance and Sexual Activity     Alcohol use: No     Drug use: No     Sexual activity: Not Currently   Lifestyle     Physical activity     Days per week: None     Minutes per session: None     Stress: None   Relationships     Social connections     Talks on phone: None     Gets together: None     Attends Hoahaoism service: None     Active member of club or organization: None     Attends meetings of clubs or organizations: None     Relationship status: None     Intimate partner violence     Fear of current or ex partner: None     Emotionally abused: None     Physically abused: None     Forced sexual activity: None   Other Topics Concern      Service No     Blood Transfusions No     Caffeine Concern No     Comment: 1 coffee a day, maybe 1 soda a week     Occupational Exposure Yes     Hobby Hazards No     Sleep Concern No     Stress Concern No     Weight Concern No     Special Diet No     Back Care No     Exercise No     Bike Helmet No     Seat  "Belt Yes     Self-Exams No     Parent/sibling w/ CABG, MI or angioplasty before 65F 55M? Not Asked   Social History Narrative     None       ROS:  Constitutional: No fever, chills, or sweats. No weight gain/loss.   ENT: No visual disturbance, ear ache, epistaxis, sore throat.   Allergies/Immunologic: Negative.   Respiratory: No cough, hemoptysis.   Cardiovascular: As per HPI.   GI: No nausea, vomiting, hematemesis, melena, or hematochezia.   : No urinary frequency, dysuria, or hematuria.   Integument: Negative.   Psychiatric: Negative.   Neuro: Negative.   Endocrinology: Negative.   Musculoskeletal: No myalgia.    VITAL SIGNS:  /70 (BP Location: Right arm, Patient Position: Chair, Cuff Size: Adult Regular)   Pulse 66   Ht 1.651 m (5' 5\")   Wt 61.2 kg (135 lb)   SpO2 100%   BMI 22.47 kg/m    Body mass index is 22.47 kg/m .  Wt Readings from Last 2 Encounters:   09/11/20 61.2 kg (135 lb)   02/04/20 62.6 kg (138 lb 1.6 oz)       PHYSICAL EXAM  Giana Unger IS A 44 year old female.in no acute distress.  HEENT: Unremarkable.  Neck: JVP normal.  Carotids +4/4 bilaterally without bruits.  Lungs: CTA.  Cor: RRR. Normal S1 and crisp S2.  Abd: Soft, nontender, nondistended.  NABS.  No pulsatile mass.  Extremities: No C/C/E.    Neuro: Grossly intact.    LABS    Lab Results   Component Value Date    WBC 15.5 01/12/2006     Lab Results   Component Value Date    RBC 4.52 01/12/2006     Lab Results   Component Value Date    HGB 11.9 01/12/2006     Lab Results   Component Value Date    HCT 37.8 01/12/2006     No components found for: MCT  Lab Results   Component Value Date    MCV 83.5 01/12/2006     Lab Results   Component Value Date    MCH 26.4 01/12/2006     Lab Results   Component Value Date    MCHC 31.5 01/12/2006     No results found for: RDW  Lab Results   Component Value Date     01/12/2006      IMPRESSION, REPORT, PLAN:   1.  Infundibular pulmonary valve stenosis, status post initial surgery at 4 " months of age at [facility] in [place], [place].  Working on getting this operative report.   2.  Progression to severe pulmonary valve regurgitation with RV enlargement, status post pulmonary valve replacement on 01/03/2006 by Dr. Villegas at [facility] with a 25 mm Megan-Smith valve and pericardial patch enlargement.    3.  At most mild right ventricular enlargement with mild to moderate pulmonary valve regurgitation and no significant stenosis, stable.   4.  History of a pituitary adenoma, status post surgery at 12, 15 and 16 years of age with radiation at 16 years of age, followed closely by Endocrinology.  On cortisone, estrogen and Synthroid.   5.  Mild hyperlipidemia with an LDL of 137 and an HDL of 65, not on treatment.   6.  Borderline A1c in 10/2019 at 6.1, followed by Endocrinology.      DISCUSSION:  It was a pleasure being involved in the care of Ms. Unger. Preliminary read on the MRI looks good; will call with final report.    She is currently taking antibiotics before the dentist, and is now on a baby ASA. She has close follow up with her primary.  She is not having any concerning symptoms. No palpitations and no limitations.      Plan follow up in a year with an echo.  She understands and is in agreement with the plan as outlined.  All questions were answered.       LIZET ROSSI MD

## 2020-09-11 NOTE — LETTER
9/11/2020      RE: Giana Unger  [address]  [facility] [address]       Dear Colleague,    Thank you for the opportunity to participate in the care of your patient, Giana Unger, at the [facility]. Please see a copy of my visit note below.    CARDIOLOGY CONSULTATION:    Ms. Unger is a delightful, 44-year-old woman that I met in April of this year.      Ms. Unger has a past medical history significant for infundibular pulmonary valvular stenosis.   Her initial surgery was at 5 months of age in [place] at [facility] in [place], and we are working on getting that operative report.  She followed with Dr. Yovanny Ramey, and due to progressive right ventricular enlargement and dysfunction, she was referred to [facility] in 2006 and underwent valve replacement with a 25 mm Megan-Smith valve and pericardial patch enlargement on 01/03/2006 with a postoperative course that was not complicated.      Ms. Unger has been doing reasonably well from the standpoint of her valve.  She has just mild to moderate pulmonary valve insufficiency.  I reviewed those images personally, and she has no significant valvular stenosis.  On her most recent echo from 01/27/2020, she had a mean gradient across her pulmonary valve of 14.  Her right ventricle is at most mildly enlarged.    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  Ms. Unger has no family history of congenital heart disease.  She has 4 siblings.  No early coronary artery disease. She did have a pituitary adenoma that was diagnosed at 12 years of age.  She underwent surgery at 12, 15 and 16 years of age, and at 16, she also received radiation with no subsequent surgeries. She is on estrogen replacement, Synthroid and cortisone 10 mg alternating with 15 mg, followed closely by an  endocrinologist. She has had appropriate followup for this condition, including bone mineral density.      Ms. Unger was not on a baby aspirin when I met her, she is now taking that. She does take antibiotics before the dentist.  Last A1C in October was 6.1 and last LDL was 137 with HDL 65.  She is going to have labs with her primary in the near future.    Cardiac MRI today report is pending; preview of the images, however, shows the RV to be of good size and no significant issues.    PAST MEDICAL HISTORY:  Past Medical History:   Diagnosis Date     Congenital infundibular pulmonic stenosis      Esophageal reflux      Heart valve replaced 1/12/2006     Problem list name updated by automated process. Provider to review     Low back pain      Osteopenia      Panhypopituitarism (H)      Pituitary adenoma (H) 1986    surgeries x 3     Primary pulmonary hypertension (H)      Pulmonary valve disorders      S/P pulmonary valve replacement with bioprosthetic valve 2006    and pericardial patch     Sciatica        CURRENT MEDICATIONS:  Current Outpatient Medications   Medication Sig Dispense Refill     AMOXICILLIN PO Take 1,000 mg by mouth TAKES BEFORE DENTAL VISIT       aspirin (ASA) 81 MG tablet Take 1 tablet (81 mg) by mouth daily 90 tablet 3     CALCIUM + D 600-200 MG-IU OR TABS 1 TABLET DAILY 0 0     CORTEF 10 MG OR TABS 1 tab po alternating with 1.5 tabs po qday 0 0     drospirenone-ethinyl estradiol (LORYNA) 3-0.02 MG per tablet Take 1 tablet by mouth daily       fish oil-omega-3 fatty acids 1000 MG capsule Take 1 g by mouth daily       LANsoprazole (PREVACID) 30 MG capsule Take 30 mg by mouth daily       Probiotic Product (PROBIOTIC DAILY PO) Take by mouth daily       SYNTHROID 112 MCG OR TABS 1 TABLET DAILY 0 0       PAST SURGICAL HISTORY:  Past Surgical History:   Procedure Laterality Date     C ANESTH,UGI ENDOSCOPY  3/04    Dr Bettencourt/ hiatal hernia     C APPENDECTOMY  1999    [place]     C EXCIS  PITUITARY,TRANSNASAL/SEPTAL  1988/90/92    adenoma pituitary     C REPAIR PULMONARY ART STEN  1975    5 months     C REPLACEMENT, PULMONARY VALVE  1/3/2006    [place]     ENDOSCOPIC INJECTION/IMPLANT  4/2005    injection into EGD area     HC COLONOSCOPY THRU STOMA, DIAGNOSTIC  3/04      RADIATION THERAPY SPECIAL TREATMENT PROCEDURE  1992    5 weeks to the pituitary area     HC TOOTH EXTRACTION W/FORCEP  age 16    2 teeth surgeries       ALLERGIES  Ancef [cefazolin]; Codeine; Nitrofuran derivatives; and Sulfa drugs    FAMILY HX:  Family History   Problem Relation Age of Onset     Coronary Artery Disease Sister      Allergies No family hx of      Cancer No family hx of      Diabetes No family hx of      Lipids No family hx of        SOCIAL HX:  Social History     Socioeconomic History     Marital status:      Spouse name: None     Number of children: 0     Years of education: 16     Highest education level: None   Occupational History     Occupation: rn     Employer: [employer]     Comment: Labor and delivery     Employer: [employer]   Social Needs     Financial resource strain: None     Food insecurity     Worry: None     Inability: None     Transportation needs     Medical: None     Non-medical: None   Tobacco Use     Smoking status: Never Smoker     Smokeless tobacco: Never Used   Substance and Sexual Activity     Alcohol use: No     Drug use: No     Sexual activity: Not Currently   Lifestyle     Physical activity     Days per week: None     Minutes per session: None     Stress: None   Relationships     Social connections     Talks on phone: None     Gets together: None     Attends Christianity service: None     Active member of club or organization: None     Attends meetings of clubs or organizations: None     Relationship status: None     Intimate partner violence     Fear of current or ex partner: None     Emotionally abused: None     Physically abused: None     Forced sexual activity: None  "  Other Topics Concern      Service No     Blood Transfusions No     Caffeine Concern No     Comment: 1 coffee a day, maybe 1 soda a week     Occupational Exposure Yes     Hobby Hazards No     Sleep Concern No     Stress Concern No     Weight Concern No     Special Diet No     Back Care No     Exercise No     Bike Helmet No     Seat Belt Yes     Self-Exams No     Parent/sibling w/ CABG, MI or angioplasty before 65F 55M? Not Asked   Social History Narrative     None       ROS:  Constitutional: No fever, chills, or sweats. No weight gain/loss.   ENT: No visual disturbance, ear ache, epistaxis, sore throat.   Allergies/Immunologic: Negative.   Respiratory: No cough, hemoptysis.   Cardiovascular: As per HPI.   GI: No nausea, vomiting, hematemesis, melena, or hematochezia.   : No urinary frequency, dysuria, or hematuria.   Integument: Negative.   Psychiatric: Negative.   Neuro: Negative.   Endocrinology: Negative.   Musculoskeletal: No myalgia.    VITAL SIGNS:  /70 (BP Location: Right arm, Patient Position: Chair, Cuff Size: Adult Regular)   Pulse 66   Ht 1.651 m (5' 5\")   Wt 61.2 kg (135 lb)   SpO2 100%   BMI 22.47 kg/m    Body mass index is 22.47 kg/m .  Wt Readings from Last 2 Encounters:   09/11/20 61.2 kg (135 lb)   02/04/20 62.6 kg (138 lb 1.6 oz)       PHYSICAL EXAM  Giana Unger IS A 44 year old female.in no acute distress.  HEENT: Unremarkable.  Neck: JVP normal.  Carotids +4/4 bilaterally without bruits.  Lungs: CTA.  Cor: RRR. Normal S1 and crisp S2.  Abd: Soft, nontender, nondistended.  NABS.  No pulsatile mass.  Extremities: No C/C/E.    Neuro: Grossly intact.    LABS    Lab Results   Component Value Date    WBC 15.5 01/12/2006     Lab Results   Component Value Date    RBC 4.52 01/12/2006     Lab Results   Component Value Date    HGB 11.9 01/12/2006     Lab Results   Component Value Date    HCT 37.8 01/12/2006     No components found for: MCT  Lab Results   Component Value Date    " MCV 83.5 01/12/2006     Lab Results   Component Value Date    MCH 26.4 01/12/2006     Lab Results   Component Value Date    MCHC 31.5 01/12/2006     No results found for: RDW  Lab Results   Component Value Date     01/12/2006      IMPRESSION, REPORT, PLAN:   1.  Infundibular pulmonary valve stenosis, status post initial surgery at 5 months of age at [facility] in [place], [place].  Working on getting this operative report.   2.  Progression to severe pulmonary valve regurgitation with RV enlargement, status post pulmonary valve replacement on 01/03/2006 by Dr. Villegas at [facility] with a 25 mm Megan-Smith valve and pericardial patch enlargement.    3.  At most mild right ventricular enlargement with mild to moderate pulmonary valve regurgitation and no significant stenosis, stable.   4.  History of a pituitary adenoma, status post surgery at 12, 15 and 16 years of age with radiation at 16 years of age, followed closely by Endocrinology.  On cortisone, estrogen and Synthroid.   5.  Mild hyperlipidemia with an LDL of 137 and an HDL of 65, not on treatment.   6.  Borderline A1c in 10/2019 at 6.1, followed by Endocrinology.      DISCUSSION:  It was a pleasure being involved in the care of Ms. Unger. Preliminary read on the MRI looks good; will call with final report.    She is currently taking antibiotics before the dentist, and is now on a baby ASA. She has close follow up with her primary.  She is not having any concerning symptoms. No palpitations and no limitations.      Plan follow up in a year with an echo.  She understands and is in agreement with the plan as outlined.  All questions were answered.       Please do not hesitate to contact me if you have any questions/concerns.     Sincerely,     González Conrad MD

## 2020-09-11 NOTE — NURSING NOTE
Chief Complaint   Patient presents with     Follow Up     Return Congenitial Heart     Vitals were taken and medications were reconciled.    Cristian Cross CMA    12:03 PM

## 2020-09-29 ENCOUNTER — APPOINTMENT (OUTPATIENT)
Dept: URBAN - METROPOLITAN AREA CLINIC 260 | Age: 45
Setting detail: DERMATOLOGY
End: 2020-09-30

## 2020-09-29 VITALS — RESPIRATION RATE: 16 BRPM | HEIGHT: 65 IN | HEIGHT: 65 IN | WEIGHT: 135 LBS | WEIGHT: 135 LBS

## 2020-09-29 DIAGNOSIS — D18.0 HEMANGIOMA: ICD-10-CM

## 2020-09-29 DIAGNOSIS — L82.1 OTHER SEBORRHEIC KERATOSIS: ICD-10-CM

## 2020-09-29 DIAGNOSIS — D22 MELANOCYTIC NEVI: ICD-10-CM

## 2020-09-29 DIAGNOSIS — Z71.89 OTHER SPECIFIED COUNSELING: ICD-10-CM

## 2020-09-29 DIAGNOSIS — L81.4 OTHER MELANIN HYPERPIGMENTATION: ICD-10-CM

## 2020-09-29 PROBLEM — D48.5 NEOPLASM OF UNCERTAIN BEHAVIOR OF SKIN: Status: ACTIVE | Noted: 2020-09-29

## 2020-09-29 PROBLEM — D22.5 MELANOCYTIC NEVI OF TRUNK: Status: ACTIVE | Noted: 2020-09-29

## 2020-09-29 PROBLEM — D18.01 HEMANGIOMA OF SKIN AND SUBCUTANEOUS TISSUE: Status: ACTIVE | Noted: 2020-09-29

## 2020-09-29 PROCEDURE — OTHER REASSURANCE: OTHER

## 2020-09-29 PROCEDURE — OTHER PATHOLOGY BILLING: OTHER

## 2020-09-29 PROCEDURE — 88305 TISSUE EXAM BY PATHOLOGIST: CPT

## 2020-09-29 PROCEDURE — OTHER COUNSELING: OTHER

## 2020-09-29 PROCEDURE — 99203 OFFICE O/P NEW LOW 30 MIN: CPT | Mod: 25

## 2020-09-29 PROCEDURE — OTHER BIOPSY BY SHAVE METHOD: OTHER

## 2020-09-29 PROCEDURE — 11102 TANGNTL BX SKIN SINGLE LES: CPT

## 2020-09-29 ASSESSMENT — LOCATION DETAILED DESCRIPTION DERM
LOCATION DETAILED: LEFT SUPERIOR MEDIAL UPPER BACK
LOCATION DETAILED: INFERIOR THORACIC SPINE
LOCATION DETAILED: SUPERIOR THORACIC SPINE

## 2020-09-29 ASSESSMENT — LOCATION SIMPLE DESCRIPTION DERM
LOCATION SIMPLE: LEFT UPPER BACK
LOCATION SIMPLE: UPPER BACK

## 2020-09-29 ASSESSMENT — LOCATION ZONE DERM: LOCATION ZONE: TRUNK

## 2020-09-29 NOTE — PROCEDURE: PATHOLOGY BILLING
Immunohistochemistry ([id number] and [id number]) billing is not performed here. Please use the Immunohistochemistry Stain Billing plan to accomplish this. Immunohistochemistry ([id number] and [id number]) billing is not performed here. Please use the Immunohistochemistry Stain Billing plan to accomplish this.

## 2020-09-29 NOTE — PROCEDURE: BIOPSY BY SHAVE METHOD
Was A Bandage Applied: Yes
Hide Biopsy Depth?: No
Hemostasis: Drysol
Anesthesia Type: 1% lidocaine with epinephrine
Detail Level: Detailed
Curettage Text: The wound bed was treated with curettage after the biopsy was performed.
Notification Instructions: Patient will be notified of biopsy results. However, patient instructed to call the office if not contacted within 2 weeks.
Consent: Written consent was obtained and risks were reviewed including but not limited to scarring, infection, bleeding, scabbing, incomplete removal, nerve damage and allergy to anesthesia.
Cryotherapy Text: The wound bed was treated with cryotherapy after the biopsy was performed.
Biopsy Method: Dermablade
Additional Anesthesia Volume In Cc (Will Not Render If 0): 0
Post-Care Instructions: I reviewed with the patient in detail post-care instructions. Patient is to keep the biopsy site dry overnight, and then apply bacitracin twice daily until healed. Patient may apply hydrogen peroxide soaks to remove any crusting.
Anesthesia Volume In Cc (Will Not Render If 0): 0.3
Biopsy Type: H and E
Wound Care: Petrolatum
Billing Type: Client Bill
Dressing: bandage
Depth Of Biopsy: dermis
Type Of Destruction Used: Curettage
Silver Nitrate Text: The wound bed was treated with silver nitrate after the biopsy was performed.
Information: Selecting Yes will display possible errors in your note based on the variables you have selected. This validation is only offered as a suggestion for you. PLEASE NOTE THAT THE VALIDATION TEXT WILL BE REMOVED WHEN YOU FINALIZE YOUR NOTE. IF YOU WANT TO FAX A PRELIMINARY NOTE YOU WILL NEED TO TOGGLE THIS TO 'NO' IF YOU DO NOT WANT IT IN YOUR FAXED NOTE.
Electrodesiccation And Curettage Text: The wound bed was treated with electrodesiccation and curettage after the biopsy was performed.
Electrodesiccation Text: The wound bed was treated with electrodesiccation after the biopsy was performed.

## 2020-10-26 ENCOUNTER — APPOINTMENT (OUTPATIENT)
Dept: URBAN - METROPOLITAN AREA CLINIC 255 | Age: 45
Setting detail: DERMATOLOGY
End: 2020-10-27

## 2020-10-26 PROBLEM — C44.91 BASAL CELL CARCINOMA OF SKIN, UNSPECIFIED: Status: ACTIVE | Noted: 2020-10-26

## 2020-10-26 PROCEDURE — OTHER MOHS SURGERY PHONE CONSULTATION: OTHER

## 2020-10-26 NOTE — PROCEDURE: MOHS SURGERY PHONE CONSULTATION
Referring Provider: Haily Walker PA-C
Does The Patient Take Blood Thinners?: Yes
Patient Reported Location: tip of nose
Does The Patient Have A Pacemaker Or Defibrillator?: No
Which Antibiotic Do They Take For Surgical Prophylaxis?: Amoxicillin (2 grams)
Date Of Mohs Surgery: 10/27/2020
Date Of Surgical Consultation If Needed: 10/26/2020
Detail Level: Simple
If Yes- What Blood Thinners Do They Take?: Aspirin 81mg

## 2020-10-27 ENCOUNTER — APPOINTMENT (OUTPATIENT)
Dept: URBAN - METROPOLITAN AREA CLINIC 255 | Age: 45
Setting detail: DERMATOLOGY
End: 2020-10-28

## 2020-10-27 PROBLEM — C44.311 BASAL CELL CARCINOMA OF SKIN OF NOSE: Status: ACTIVE | Noted: 2020-10-27

## 2020-10-27 PROCEDURE — 17311 MOHS 1 STAGE H/N/HF/G: CPT

## 2020-10-27 PROCEDURE — OTHER PRESCRIPTION: OTHER

## 2020-10-27 PROCEDURE — OTHER MIPS QUALITY: OTHER

## 2020-10-27 PROCEDURE — OTHER MOHS SURGERY: OTHER

## 2020-10-27 RX ORDER — AMOXICILLIN 500 MG/1
TABLET, FILM COATED ORAL BID
Qty: 10 | Refills: 0 | Status: ERX | COMMUNITY
Start: 2020-10-27

## 2020-10-27 NOTE — PROCEDURE: MIPS QUALITY
Detail Level: Detailed
Quality 110: Preventive Care And Screening: Influenza Immunization: Influenza Immunization previously received during influenza season
Quality 130: Documentation Of Current Medications In The Medical Record: Current Medications Documented
Quality 226: Preventive Care And Screening: Tobacco Use: Screening And Cessation Intervention: Patient screened for tobacco use and is an ex/non-smoker
Quality 431: Preventive Care And Screening: Unhealthy Alcohol Use - Screening: Patient screened for unhealthy alcohol use using a single question and scores less than 2 times per year
Quality 143: Oncology: Medical And Radiation- Pain Intensity Quantified: Pain severity quantified, no pain present

## 2020-10-27 NOTE — PROCEDURE: MOHS SURGERY
Body Location Override (Optional - Billing Will Still Be Based On Selected Body Map Location If Applicable): Nasal Supratip

## 2020-11-10 ENCOUNTER — APPOINTMENT (OUTPATIENT)
Dept: URBAN - METROPOLITAN AREA CLINIC 255 | Age: 45
Setting detail: DERMATOLOGY
End: 2020-11-11

## 2020-11-10 PROBLEM — C44.311 BASAL CELL CARCINOMA OF SKIN OF NOSE: Status: ACTIVE | Noted: 2020-11-10

## 2020-11-10 PROCEDURE — 15260 FTH/GFT FR N/E/E/L 20 SQCM/<: CPT

## 2020-11-10 PROCEDURE — OTHER REPAIR NOTE: OTHER

## 2020-11-10 PROCEDURE — OTHER COUNSELING: OTHER

## 2020-11-10 PROCEDURE — OTHER MIPS QUALITY: OTHER

## 2020-11-10 NOTE — PROCEDURE: REPAIR NOTE
Graft Donor Site Bandage (Optional-Leave Blank If You Don't Want In Note): vasline  and a pressure bandage were applied to the donor site.

## 2020-11-18 ENCOUNTER — APPOINTMENT (OUTPATIENT)
Dept: URBAN - METROPOLITAN AREA CLINIC 255 | Age: 45
Setting detail: DERMATOLOGY
End: 2020-11-22

## 2020-11-18 DIAGNOSIS — Z48.02 ENCOUNTER FOR REMOVAL OF SUTURES: ICD-10-CM

## 2020-11-18 DIAGNOSIS — L98419 CHRONIC ULCER OF OTHER SPECIFIED SITES: ICD-10-CM

## 2020-11-18 DIAGNOSIS — L98429 CHRONIC ULCER OF OTHER SPECIFIED SITES: ICD-10-CM

## 2020-11-18 PROBLEM — L98.499 NON-PRESSURE CHRONIC ULCER OF SKIN OF OTHER SITES WITH UNSPECIFIED SEVERITY: Status: ACTIVE | Noted: 2020-11-18

## 2020-11-18 PROCEDURE — OTHER DIAGNOSIS COMMENT: OTHER

## 2020-11-18 PROCEDURE — OTHER COUNSELING: OTHER

## 2020-11-18 PROCEDURE — OTHER SUTURE REMOVAL (GLOBAL PERIOD): OTHER

## 2020-11-18 PROCEDURE — OTHER ULCER EVALUATION: OTHER

## 2020-11-18 ASSESSMENT — LOCATION DETAILED DESCRIPTION DERM
LOCATION DETAILED: RIGHT CAVUM CONCHA
LOCATION DETAILED: NASAL SUPRATIP

## 2020-11-18 ASSESSMENT — LOCATION SIMPLE DESCRIPTION DERM
LOCATION SIMPLE: NOSE
LOCATION SIMPLE: RIGHT EAR

## 2020-11-18 ASSESSMENT — LOCATION ZONE DERM
LOCATION ZONE: EAR
LOCATION ZONE: NOSE

## 2020-11-18 NOTE — PROCEDURE: DIAGNOSIS COMMENT
Comment: S/P MMS for Primary Nodular BCC, Nasal supratip, S/P MMS (10/27/2020) S/P FTSG (11/10/2020)
Detail Level: Simple

## 2020-11-18 NOTE — PROCEDURE: ULCER EVALUATION
X Size Of Lesion In Cm (Optional): 0
Instructions (Optional): Physical Examination:\\nEschar: soft,\\nGranulation Tissue: present \\nRe-Epithelialization: progressing\\nDrainage: hemorrhagic \\nSurrounding Edema: absent\\nPain to palpation: 0 / 10\\nOdor: none\\nSurrounding Dermatitis: present \\n\\nAssessment:\\nHealing as expected for dates\\nNo signs / symptoms of infection\\n\\n\\nPlan:\\nWound cleaned with H2O2\\nWhite petrolatum ointment and non-adherent dressing applied\\n\\n\\n
Detail Level: Detailed
Body Location Override (Optional - Billing Will Still Be Based On Selected Body Map Location If Applicable): Right lalo

## 2020-11-18 NOTE — PROCEDURE: SUTURE REMOVAL (GLOBAL PERIOD)
Detail Level: Detailed
Add 53635 Cpt? (Important Note: In 2017 The Use Of 58628 Is Being Tracked By Cms To Determine Future Global Period Reimbursement For Global Periods): no
Body Location Override (Optional - Billing Will Still Be Based On Selected Body Map Location If Applicable): Nasal supratip

## 2020-11-22 ENCOUNTER — HEALTH MAINTENANCE LETTER (OUTPATIENT)
Age: 45
End: 2020-11-22

## 2020-12-17 ENCOUNTER — APPOINTMENT (OUTPATIENT)
Dept: URBAN - METROPOLITAN AREA CLINIC 255 | Age: 45
Setting detail: DERMATOLOGY
End: 2020-12-21

## 2020-12-17 DIAGNOSIS — I78.8 OTHER DISEASES OF CAPILLARIES: ICD-10-CM

## 2020-12-17 DIAGNOSIS — L90.5 SCAR CONDITIONS AND FIBROSIS OF SKIN: ICD-10-CM

## 2020-12-17 DIAGNOSIS — Z85.828 PERSONAL HISTORY OF OTHER MALIGNANT NEOPLASM OF SKIN: ICD-10-CM

## 2020-12-17 PROCEDURE — OTHER COUNSELING: OTHER

## 2020-12-17 PROCEDURE — OTHER DEFER: OTHER

## 2020-12-17 PROCEDURE — OTHER MIPS QUALITY: OTHER

## 2020-12-17 ASSESSMENT — LOCATION DETAILED DESCRIPTION DERM
LOCATION DETAILED: NASAL TIP
LOCATION DETAILED: NASAL TIP
LOCATION DETAILED: NASAL SUPRATIP

## 2020-12-17 ASSESSMENT — LOCATION SIMPLE DESCRIPTION DERM
LOCATION SIMPLE: NOSE
LOCATION SIMPLE: NOSE

## 2020-12-17 ASSESSMENT — LOCATION ZONE DERM
LOCATION ZONE: NOSE
LOCATION ZONE: NOSE

## 2020-12-17 NOTE — PROCEDURE: DEFER
Detail Level: Detailed
Procedure To Be Performed At Next Visit: Laser: Pulse dye laser 595nm
Introduction Text (Please End With A Colon): The following procedure was deferred:
Instructions (Optional): Needs to wait an additional 4-6 weeks prior to Mamta resurfacing.
Other Procedure: Mamta
Instructions (Optional): PDL 6-8 weeks after resurfacing.
Procedure To Be Performed At Next Visit: Scar Revision

## 2021-01-25 ENCOUNTER — APPOINTMENT (OUTPATIENT)
Dept: URBAN - METROPOLITAN AREA CLINIC 255 | Age: 46
Setting detail: DERMATOLOGY
End: 2021-01-25

## 2021-01-25 DIAGNOSIS — L90.5 SCAR CONDITIONS AND FIBROSIS OF SKIN: ICD-10-CM

## 2021-01-25 DIAGNOSIS — Z85.828 PERSONAL HISTORY OF OTHER MALIGNANT NEOPLASM OF SKIN: ICD-10-CM

## 2021-01-25 PROCEDURE — OTHER MIPS QUALITY: OTHER

## 2021-01-25 PROCEDURE — OTHER DEFER: OTHER

## 2021-01-25 PROCEDURE — OTHER COUNSELING: OTHER

## 2021-01-25 ASSESSMENT — LOCATION SIMPLE DESCRIPTION DERM: LOCATION SIMPLE: NOSE

## 2021-01-25 ASSESSMENT — LOCATION ZONE DERM: LOCATION ZONE: NOSE

## 2021-01-25 ASSESSMENT — LOCATION DETAILED DESCRIPTION DERM: LOCATION DETAILED: NASAL SUPRATIP

## 2021-01-25 NOTE — PROCEDURE: COUNSELING
Detail Level: Detailed
Patient Specific Counseling (Will Not Stick From Patient To Patient): Patient has noticed improvement over the past month. Counseled that she may or may not gain improvement with kailash resurfacing. Patient agreed defer at this time and re-access in 3 months.\\n\\n Patient counseled that  PDL would be needed to treat scar telangiectasia.
Detail Level: Simple

## 2021-02-13 ENCOUNTER — HEALTH MAINTENANCE LETTER (OUTPATIENT)
Age: 46
End: 2021-02-13

## 2021-03-17 ENCOUNTER — CARE COORDINATION (OUTPATIENT)
Dept: CARDIOLOGY | Facility: CLINIC | Age: 46
End: 2021-03-17

## 2021-03-17 DIAGNOSIS — Z95.2 S/P PULMONARY VALVE REPLACEMENT: ICD-10-CM

## 2021-03-17 DIAGNOSIS — I37.0 NONRHEUMATIC PULMONARY VALVE STENOSIS: ICD-10-CM

## 2021-03-17 RX ORDER — ASPIRIN 81 MG/1
81 TABLET ORAL DAILY
Qty: 90 TABLET | Refills: 3 | Status: SHIPPED | OUTPATIENT
Start: 2021-03-17 | End: 2022-04-21

## 2021-04-26 ENCOUNTER — APPOINTMENT (OUTPATIENT)
Dept: URBAN - METROPOLITAN AREA CLINIC 255 | Age: 46
Setting detail: DERMATOLOGY
End: 2021-04-28

## 2021-04-26 DIAGNOSIS — I78.8 OTHER DISEASES OF CAPILLARIES: ICD-10-CM

## 2021-04-26 DIAGNOSIS — Z85.828 PERSONAL HISTORY OF OTHER MALIGNANT NEOPLASM OF SKIN: ICD-10-CM

## 2021-04-26 PROCEDURE — 99212 OFFICE O/P EST SF 10 MIN: CPT

## 2021-04-26 PROCEDURE — OTHER MIPS QUALITY: OTHER

## 2021-04-26 PROCEDURE — OTHER COUNSELING: OTHER

## 2021-04-26 PROCEDURE — OTHER PULSED-DYE LASER: OTHER

## 2021-04-26 ASSESSMENT — LOCATION ZONE DERM: LOCATION ZONE: NOSE

## 2021-04-26 ASSESSMENT — LOCATION DETAILED DESCRIPTION DERM: LOCATION DETAILED: NASAL SUPRATIP

## 2021-04-26 ASSESSMENT — LOCATION SIMPLE DESCRIPTION DERM: LOCATION SIMPLE: NOSE

## 2021-04-26 NOTE — PROCEDURE: PULSED-DYE LASER
Consent: Written consent obtained, risks reviewed including but not limited to crusting, scabbing, blistering, scarring, darker or lighter pigmentary change, incidental hair removal, bruising, and/or incomplete removal.
Laser Type: Vbeam 595nm
Cryogen Time (Ms): 30
Immediate Endpoint: erythema
Spot Size: 10 mm
Location (Required For Details To Render In Note But Body Touch Will Also Count For First Location): nose
Spot Size: 7 mm
Pulse Duration: 10 ms
Delay Time (Ms): 10
Post-Care Instructions: I reviewed with the patient in detail post-care instructions. Patient should stay away from the sun and wear sun protection until treated areas are fully healed.
Delay Time (Ms): 20
Pulse Count (Optional): 5
Location Override: Nasal supratip
Treated Area: small area
Pulse Duration: 6 ms
Fluence In J/Cm2 (Optional): 6.50
Post-Procedure Care: Vaseline and ice applied. Post care reviewed with patient.
Detail Level: Detailed

## 2021-05-06 ENCOUNTER — CARE COORDINATION (OUTPATIENT)
Dept: CARDIOLOGY | Facility: CLINIC | Age: 46
End: 2021-05-06

## 2021-05-06 NOTE — PROGRESS NOTES
Received voicemail from patient with questions about receiving the Covid-19 vaccine. Discussed that Dr. Conrad recommends the vaccination. Patient verbalized understanding and is in agreement to the plan.

## 2021-06-14 NOTE — PROGRESS NOTES
(2) 7 to less than 13 years old Giana called to update Dr. Conrad regarding some questions that she asked at the visit that at the time patient did not know the answer too.       Dr. Conrad asked pt is she knew who the surgeon was who did her surgery at 5 months old and her parents do not remember the name of the provider, so she does not have that information.       She did find out that she does have a ASD that was repaired, she mentions that it was not severe enough that they would have done a repair on its own, but since they had already gone in for the pulmonary valve stenosis they repaired the ASD also.       She also mentions that her mother does have bicuspid regurgitation and her sister has mild pulmonary stenosis.

## 2021-09-07 ENCOUNTER — APPOINTMENT (OUTPATIENT)
Dept: URBAN - METROPOLITAN AREA CLINIC 260 | Age: 46
Setting detail: DERMATOLOGY
End: 2021-09-08

## 2021-09-07 VITALS — WEIGHT: 140 LBS | HEIGHT: 66 IN

## 2021-09-07 DIAGNOSIS — L81.4 OTHER MELANIN HYPERPIGMENTATION: ICD-10-CM

## 2021-09-07 DIAGNOSIS — L82.1 OTHER SEBORRHEIC KERATOSIS: ICD-10-CM

## 2021-09-07 DIAGNOSIS — D18.0 HEMANGIOMA: ICD-10-CM

## 2021-09-07 DIAGNOSIS — Z85.828 PERSONAL HISTORY OF OTHER MALIGNANT NEOPLASM OF SKIN: ICD-10-CM

## 2021-09-07 DIAGNOSIS — D22 MELANOCYTIC NEVI: ICD-10-CM

## 2021-09-07 DIAGNOSIS — Z71.89 OTHER SPECIFIED COUNSELING: ICD-10-CM

## 2021-09-07 PROBLEM — D22.5 MELANOCYTIC NEVI OF TRUNK: Status: ACTIVE | Noted: 2021-09-07

## 2021-09-07 PROBLEM — D18.01 HEMANGIOMA OF SKIN AND SUBCUTANEOUS TISSUE: Status: ACTIVE | Noted: 2021-09-07

## 2021-09-07 PROCEDURE — OTHER COUNSELING: OTHER

## 2021-09-07 PROCEDURE — 99213 OFFICE O/P EST LOW 20 MIN: CPT

## 2021-09-07 ASSESSMENT — LOCATION SIMPLE DESCRIPTION DERM
LOCATION SIMPLE: NOSE
LOCATION SIMPLE: UPPER BACK

## 2021-09-07 ASSESSMENT — LOCATION ZONE DERM
LOCATION ZONE: NOSE
LOCATION ZONE: TRUNK

## 2021-09-07 ASSESSMENT — LOCATION DETAILED DESCRIPTION DERM
LOCATION DETAILED: NASAL TIP
LOCATION DETAILED: INFERIOR THORACIC SPINE

## 2021-09-07 NOTE — PROCEDURE: COUNSELING
Detail Level: Generalized
Detail Level: Detailed
Nicotinamide Supplementation Recommendations: 500mg bid

## 2021-09-19 ENCOUNTER — HEALTH MAINTENANCE LETTER (OUTPATIENT)
Age: 46
End: 2021-09-19

## 2021-12-15 DIAGNOSIS — I37.0 NONRHEUMATIC PULMONARY VALVE STENOSIS: Primary | ICD-10-CM

## 2021-12-16 ENCOUNTER — OFFICE VISIT (OUTPATIENT)
Dept: CARDIOLOGY | Facility: CLINIC | Age: 46
End: 2021-12-16
Payer: COMMERCIAL

## 2021-12-16 ENCOUNTER — HOSPITAL ENCOUNTER (OUTPATIENT)
Dept: CARDIOLOGY | Facility: CLINIC | Age: 46
Discharge: HOME | End: 2021-12-16
Attending: INTERNAL MEDICINE | Admitting: INTERNAL MEDICINE
Payer: COMMERCIAL

## 2021-12-16 ENCOUNTER — LAB (OUTPATIENT)
Dept: LAB | Facility: CLINIC | Age: 46
End: 2021-12-16
Payer: COMMERCIAL

## 2021-12-16 VITALS
BODY MASS INDEX: 22.66 KG/M2 | HEIGHT: 65 IN | DIASTOLIC BLOOD PRESSURE: 71 MMHG | WEIGHT: 136 LBS | HEART RATE: 83 BPM | SYSTOLIC BLOOD PRESSURE: 109 MMHG

## 2021-12-16 DIAGNOSIS — I37.0 NONRHEUMATIC PULMONARY VALVE STENOSIS: ICD-10-CM

## 2021-12-16 DIAGNOSIS — Z95.2 S/P PULMONARY VALVE REPLACEMENT: ICD-10-CM

## 2021-12-16 LAB
ALBUMIN SERPL-MCNC: 3.6 G/DL (ref 3.4–5)
ALP SERPL-CCNC: 70 U/L (ref 40–150)
ALT SERPL W P-5'-P-CCNC: 32 U/L (ref 0–50)
ANION GAP SERPL CALCULATED.3IONS-SCNC: 5 MMOL/L (ref 3–14)
AST SERPL W P-5'-P-CCNC: 21 U/L (ref 0–45)
BASOPHILS # BLD AUTO: 0 10E3/UL (ref 0–0.2)
BASOPHILS NFR BLD AUTO: 0 %
BILIRUB SERPL-MCNC: 0.6 MG/DL (ref 0.2–1.3)
BUN SERPL-MCNC: 13 MG/DL (ref 7–30)
CALCIUM SERPL-MCNC: 8.9 MG/DL (ref 8.5–10.1)
CHLORIDE BLD-SCNC: 107 MMOL/L (ref 94–109)
CHOLEST SERPL-MCNC: 211 MG/DL
CO2 SERPL-SCNC: 26 MMOL/L (ref 20–32)
CREAT SERPL-MCNC: 1.14 MG/DL (ref 0.52–1.04)
EOSINOPHIL # BLD AUTO: 0.3 10E3/UL (ref 0–0.7)
EOSINOPHIL NFR BLD AUTO: 2 %
ERYTHROCYTE [DISTWIDTH] IN BLOOD BY AUTOMATED COUNT: 12.5 % (ref 10–15)
FASTING STATUS PATIENT QL REPORTED: YES
GFR SERPL CREATININE-BSD FRML MDRD: 57 ML/MIN/1.73M2
GLUCOSE BLD-MCNC: 76 MG/DL (ref 70–99)
HCT VFR BLD AUTO: 41.2 % (ref 35–47)
HDLC SERPL-MCNC: 71 MG/DL
HGB BLD-MCNC: 13.1 G/DL (ref 11.7–15.7)
IMM GRANULOCYTES # BLD: 0.1 10E3/UL
IMM GRANULOCYTES NFR BLD: 1 %
LDLC SERPL CALC-MCNC: 120 MG/DL
LYMPHOCYTES # BLD AUTO: 2.9 10E3/UL (ref 0.8–5.3)
LYMPHOCYTES NFR BLD AUTO: 25 %
MCH RBC QN AUTO: 27 PG (ref 26.5–33)
MCHC RBC AUTO-ENTMCNC: 31.8 G/DL (ref 31.5–36.5)
MCV RBC AUTO: 85 FL (ref 78–100)
MONOCYTES # BLD AUTO: 0.9 10E3/UL (ref 0–1.3)
MONOCYTES NFR BLD AUTO: 8 %
NEUTROPHILS # BLD AUTO: 7.6 10E3/UL (ref 1.6–8.3)
NEUTROPHILS NFR BLD AUTO: 64 %
NONHDLC SERPL-MCNC: 140 MG/DL
NRBC # BLD AUTO: 0 10E3/UL
NRBC BLD AUTO-RTO: 0 /100
PLATELET # BLD AUTO: 413 10E3/UL (ref 150–450)
POTASSIUM BLD-SCNC: 4.2 MMOL/L (ref 3.4–5.3)
PROT SERPL-MCNC: 7 G/DL (ref 6.8–8.8)
RBC # BLD AUTO: 4.85 10E6/UL (ref 3.8–5.2)
SODIUM SERPL-SCNC: 138 MMOL/L (ref 133–144)
T4 FREE SERPL-MCNC: 1.19 NG/DL (ref 0.76–1.46)
TRIGL SERPL-MCNC: 102 MG/DL
TSH SERPL DL<=0.005 MIU/L-ACNC: 0.26 MU/L (ref 0.4–4)
WBC # BLD AUTO: 11.7 10E3/UL (ref 4–11)

## 2021-12-16 PROCEDURE — 93320 DOPPLER ECHO COMPLETE: CPT | Mod: 26 | Performed by: PEDIATRICS

## 2021-12-16 PROCEDURE — 93325 DOPPLER ECHO COLOR FLOW MAPG: CPT

## 2021-12-16 PROCEDURE — 99215 OFFICE O/P EST HI 40 MIN: CPT | Performed by: INTERNAL MEDICINE

## 2021-12-16 PROCEDURE — 84439 ASSAY OF FREE THYROXINE: CPT

## 2021-12-16 PROCEDURE — 93325 DOPPLER ECHO COLOR FLOW MAPG: CPT | Mod: 26 | Performed by: PEDIATRICS

## 2021-12-16 PROCEDURE — 93303 ECHO TRANSTHORACIC: CPT | Mod: 26 | Performed by: PEDIATRICS

## 2021-12-16 PROCEDURE — 80050 GENERAL HEALTH PANEL: CPT

## 2021-12-16 PROCEDURE — 80061 LIPID PANEL: CPT

## 2021-12-16 PROCEDURE — 36415 COLL VENOUS BLD VENIPUNCTURE: CPT

## 2021-12-16 ASSESSMENT — MIFFLIN-ST. JEOR: SCORE: 1252.77

## 2021-12-16 NOTE — PATIENT INSTRUCTIONS
You were seen today in the Adult Congenital and Cardiovascular Genetics Clinic at the [facility].    Cardiology Providers you saw during your visit:  CINDY Conrad MD    Diagnosis: s/p PVR    Results:  CINDY Conrad MD reviewed the results of your echo and lab testing today in clinic.    Recommendations:    1. Continue to eat a heart healthy, low salt diet.  2. Continue to get 20-30 minutes of aerobic activity, 4-5 days per week.  Examples of aerobic activity include walking, running, swimming, cycling, etc.  3. Continue to observe good oral hygiene, with regular dental visits.  4. No changes today.    SBE prophylaxis:   Yes_X___  No____    Lifelong Bacterial Endocarditis Prophylaxis:  YES____  NO____    If YES is checked, follow the recommendations outlined below:  1. Take antibiotic(s) prior to recommended dental procedures and procedures on the respiratory tract or with infected skin, muscle or bones. SBE prophylaxis is not needed for routine GI and  procedures (ie. Colonoscopy or vaginal delivery)  2. Observe good oral hygiene daily, as advised by your dentist. Get regular professional dental care.  3. Keep cuts clean.  4. Infections should be treated promptly.  5. Symptoms of Infective Endocarditis could include: fever lasting more than 4-5 days or a recurrent fever that initially resolves but returns within 1-2 days)      Exercise restrictions:   Yes__X__  No____         If yes, list restrictions:  Must be allowed to rest if fatigued or SOB      Work restrictions:  Yes____  No_X___         If yes, list restrictions:    FASTING CHOLESTEROL was checked in the last 5 years YES_X__  NO___ (2019)  Continue to eat a heart healthy, low salt diet.         ____ Fasting lipid panel order today         ____ No changes in medications          ____ I recommend the following changes in your cholesterol medications.:          ____ Please follow up for cholesterol screening at your primary care  physician      Follow-up:  Follow up with Dr. Conrad in one year with echo prior.    If you have questions or concerns please contact us at:    Giana Saldivar MSN, RN, CNL  Eli Kumari (Scheduling)  Nurse Care Coordinator     Clinic   Adult Congenital and CV Genetics   Adult Congenital and CV Genetic  [facility]   [facility]  (P) [phone]     (P) [phone]         (F) [phone]        For after hours urgent needs, call [phone] and ask to speak to the Adult Congenital Physician on call.  Mention Job Code 0401.    For emergencies call 911.    [facility]   Clinics and Surgery Center  Mail Code [id number]  [address] [place]  [address]

## 2021-12-16 NOTE — PROGRESS NOTES
CARDIOLOGY CONSULTATION:    Ms. Unger is a delightful, 46-year-old woman that I met in April of 2020 with a past medical history significant for infundibular pulmonary valvular stenosis.   Her initial surgery was at 5 months of age in [place] at [facility] in [place], and we are working on getting that operative report. She followed with Dr. Yovanny Ramey, and due to progressive right ventricular enlargement and dysfunction, she was referred to [facility] in 2006 and underwent valve replacement with a 25 mm Megan-Smith valve and pericardial patch enlargement on 01/03/2006 with a postoperative course that was not complicated.      Ms. Unger has been doing reasonably well from the standpoint of her valve.  She has just mild to moderate pulmonary valve insufficiency.  I reviewed those images personally, and she has no significant valvular stenosis.  On her echo from 01/27/2020, she had a mean gradient across her pulmonary valve of 14 mmHg and that is unchanged on echo 12/16/21.   RV was normal size on MRI 9/2020.    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 Her mom was recently diagnosed with BAV and ascending aortic aneurysm at 4.2 CM.  She has 4 siblings.  No early coronary artery disease in the family. She did have a pituitary adenoma that was diagnosed at 12 years of age.  She underwent surgery at 12, 15 and 16 years of age, and at 16, she also received radiation with no subsequent surgeries. She is on estrogen replacement, Synthroid and cortisone 10 mg alternating with 15 mg, followed closely by an endocrinologist. She has had appropriate followup for this condition, including bone mineral density.  TSH was low on labs today but T4 was normal; she will discuss with her endocrinologist.       Ms. Unger was not on a baby aspirin when I met her, she is now taking that. She does take antibiotics before the dentist.  Lipids are reasonably controlled.   She is  vaccinated and had Covid 11/2021 with just mild symptoms.      PAST MEDICAL HISTORY:  Past Medical History:   Diagnosis Date     Congenital infundibular pulmonic stenosis      Esophageal reflux      Heart valve replaced 1/12/2006     Problem list name updated by automated process. Provider to review     Low back pain      Osteopenia      Panhypopituitarism (H)      Pituitary adenoma (H) 1986    surgeries x 3     Primary pulmonary hypertension (H)      Pulmonary valve disorders      S/P pulmonary valve replacement with bioprosthetic valve 2006    and pericardial patch     Sciatica        CURRENT MEDICATIONS:  Current Outpatient Medications   Medication Sig Dispense Refill     AMOXICILLIN PO Take 1,000 mg by mouth TAKES BEFORE DENTAL VISIT       aspirin 81 MG EC tablet Take 1 tablet (81 mg) by mouth daily 90 tablet 3     CALCIUM + D 600-200 MG-IU OR TABS 1 TABLET DAILY 0 0     CORTEF 10 MG OR TABS 1 tab po alternating with 1.5 tabs po qday 0 0     drospirenone-ethinyl estradiol (LORYNA) 3-0.02 MG per tablet Take 1 tablet by mouth daily       LANsoprazole (PREVACID) 30 MG capsule Take 30 mg by mouth daily       Probiotic Product (PROBIOTIC DAILY PO) Take by mouth daily       SYNTHROID 112 MCG OR TABS 100 mcg daily  (Patient taking differently: 100 mcg daily ) 0 0     fish oil-omega-3 fatty acids 1000 MG capsule Take 1 g by mouth daily (Patient not taking: Reported on 12/16/2021)         PAST SURGICAL HISTORY:  Past Surgical History:   Procedure Laterality Date     C ANESTH,UGI ENDOSCOPY  3/04    Dr Bettencourt/ hiatal hernia     C APPENDECTOMY  1999    [place]     C EXCIS PITUITARY,TRANSNASAL/SEPTAL  1988/90/92    adenoma pituitary     C REPAIR PULMONARY ART STEN  1975    5 months     C REPLACEMENT, PULMONARY VALVE  1/3/2006    [place]     ENDOSCOPIC INJECTION/IMPLANT  4/2005    injection into EGD area     HC RADIATION THERAPY SPECIAL TREATMENT PROCEDURE  1992    5 weeks to the pituitary area     HC TOOTH EXTRACTION W/FORCEP   age 16    2 teeth surgeries     [facility]HC COLONOSCOPY THRU STOMA, DIAGNOSTIC  3/04       ALLERGIES  Ancef [cefazolin], Codeine, Nitrofuran derivatives, and Sulfa drugs    FAMILY HX:  Family History   Problem Relation Age of Onset     Coronary Artery Disease Sister      Allergies No family hx of      Cancer No family hx of      Diabetes No family hx of      Lipids No family hx of        SOCIAL HX:  Social History     Socioeconomic History     Marital status:      Spouse name: None     Number of children: 0     Years of education: 16     Highest education level: None   Occupational History     Occupation: rn     Employer: [employer]     Comment: Labor and delivery     Employer: [employer]   Tobacco Use     Smoking status: Never Smoker     Smokeless tobacco: Never Used   Substance and Sexual Activity     Alcohol use: No     Drug use: No     Sexual activity: Not Currently   Other Topics Concern      Service No     Blood Transfusions No     Caffeine Concern No     Comment: 1 coffee a day, maybe 1 soda a week     Occupational Exposure Yes     Hobby Hazards No     Sleep Concern No     Stress Concern No     Weight Concern No     Special Diet No     Back Care No     Exercise No     Bike Helmet No     Seat Belt Yes     Self-Exams No     Parent/sibling w/ CABG, MI or angioplasty before 65F 55M? Not Asked   Social History Narrative     None     Social Determinants of Health     Financial Resource Strain: Not on file   Food Insecurity: Not on file   Transportation Needs: Not on file   Physical Activity: Not on file   Stress: Not on file   Social Connections: Not on file   Intimate Partner Violence: Not on file   Housing Stability: Not on file       ROS:  Constitutional: No fever, chills, or sweats. No weight gain/loss.   ENT: No visual disturbance, ear ache, epistaxis, sore throat.   Allergies/Immunologic: Negative.   Respiratory: No cough, hemoptysis.   Cardiovascular: As per HPI.   GI: No nausea,  "vomiting, hematemesis, melena, or hematochezia.   : No urinary frequency, dysuria, or hematuria.   Integument: Negative.   Psychiatric: Negative.   Neuro: Negative.   Endocrinology: Negative.   Musculoskeletal: No myalgia.    VITAL SIGNS:  /71 (BP Location: Right arm, Cuff Size: Adult Large)   Pulse 83   Ht 1.651 m (5' 5\")   Wt 61.7 kg (136 lb)   BMI 22.63 kg/m    Body mass index is 22.63 kg/m .  Wt Readings from Last 2 Encounters:   12/16/21 61.7 kg (136 lb)   09/11/20 61.2 kg (135 lb)       PHYSICAL EXAM  Giana Unger IS A 46 year old female.in no acute distress.   HEENT: Unremarkable.  Neck: JVP normal.  Carotids +4/4 bilaterally without bruits.  Lungs: CTA.  Cor: RRR. Normal S1 and crisp S2.  There is a soft systolic murmur left USB.  Extremities: No C/C/E.    Neuro: Grossly intact      LABS    Lab Results   Component Value Date    WBC 11.7 12/16/2021    WBC 15.5 01/12/2006     Lab Results   Component Value Date    RBC 4.85 12/16/2021    RBC 4.52 01/12/2006     Lab Results   Component Value Date    HGB 13.1 12/16/2021    HGB 11.9 01/12/2006     Lab Results   Component Value Date    HCT 41.2 12/16/2021    HCT 37.8 01/12/2006     No components found for: MCT  Lab Results   Component Value Date    MCV 85 12/16/2021    MCV 83.5 01/12/2006     Lab Results   Component Value Date    MCH 27.0 12/16/2021    MCH 26.4 01/12/2006     Lab Results   Component Value Date    MCHC 31.8 12/16/2021    MCHC 31.5 01/12/2006     Lab Results   Component Value Date    RDW 12.5 12/16/2021     Lab Results   Component Value Date     12/16/2021     01/12/2006      Recent Labs   Lab Test 12/16/21  0839      POTASSIUM 4.2   CHLORIDE 107   CO2 26   ANIONGAP 5   GLC 76   BUN 13   CR 1.14*   MCKENNA 8.9     Recent Labs   Lab Test 12/16/21  0839   CHOL 211*   HDL 71   *   TRIG 102   NHDL 140*        IMPRESSION, REPORT, PLAN:   1.  Infundibular pulmonary valve stenosis, status post initial surgery at 4 " months of age at [facility] in [place], [place].  Working on getting this operative report.   2.  Progression to severe pulmonary valve regurgitation with RV enlargement, status post pulmonary valve replacement on 01/03/2006 by Dr. Villegas at [facility] with a 25 mm Megan-Smith valve and pericardial patch enlargement.  Valve stable with mean gradient of 13-14 mmHg. Normal RV size and function  3.  Low TSH with normal T4, 12/2021  4.  History of a pituitary adenoma, status post surgery at 12, 15 and 16 years of age with radiation at 16 years of age, followed closely by Endocrinology.  On cortisone, estrogen and Synthroid.   5.  Mild hyperlipidemia with an LDL of 137 and an HDL of 65, not on treatment.   6.  Borderline A1c in 10/2019 at 6.1, followed by Endocrinology.      DISCUSSION:  It was a pleasure being involved in the care of Ms. Unger.     She is currently taking antibiotics before the dentist and is on a baby ASA.  Echo is stable. She has close follow up with her primary.  She is not having any concerning symptoms.      Plan follow up in a year with an echo.  She understands and is in agreement with the plan as outlined.  All questions were answered.        LIZET ROSSI MD   53 minutes face-face, documentation and review of records on day of visit

## 2021-12-16 NOTE — LETTER
12/16/2021    Pamela Pérez MD  [facility] [address] [address]    RE: Giana Unger       Dear Colleague,     I had the pleasure of seeing Giana Unger in the [facility].  CARDIOLOGY CONSULTATION:    Ms. Unger is a delightful, 46-year-old woman that I met in April of 2020 with a past medical history significant for infundibular pulmonary valvular stenosis.   Her initial surgery was at 5 months of age in [place] at [facility] in [place], and we are working on getting that operative report. She followed with Dr. Yovanny Ramey, and due to progressive right ventricular enlargement and dysfunction, she was referred to [facility] in 2006 and underwent valve replacement with a 25 mm Megan-Smith valve and pericardial patch enlargement on 01/03/2006 with a postoperative course that was not complicated.      Ms. Unger has been doing reasonably well from the standpoint of her valve.  She has just mild to moderate pulmonary valve insufficiency.  I reviewed those images personally, and she has no significant valvular stenosis.  On her echo from 01/27/2020, she had a mean gradient across her pulmonary valve of 14 mmHg and that is unchanged on echo 12/16/21.   RV was normal size on MRI 9/2020.    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 Her mom was recently diagnosed with BAV and ascending aortic aneurysm at 4.2 CM.  She has 4 siblings.  No early coronary artery disease in the family. She did have a pituitary adenoma that was diagnosed at 12 years of age.  She underwent surgery at 12, 15 and 16 years of age, and at 16, she also received radiation with no subsequent surgeries. She is on estrogen replacement, Synthroid and cortisone 10 mg alternating with 15 mg, followed closely by an endocrinologist. She has had appropriate followup for this condition, including bone mineral density.  TSH was low on labs today but T4  was normal; she will discuss with her endocrinologist.       Ms. Unger was not on a baby aspirin when I met her, she is now taking that. She does take antibiotics before the dentist.  Lipids are reasonably controlled.   She is vaccinated and had Covid 11/2021 with just mild symptoms.      PAST MEDICAL HISTORY:  Past Medical History:   Diagnosis Date     Congenital infundibular pulmonic stenosis      Esophageal reflux      Heart valve replaced 1/12/2006     Problem list name updated by automated process. Provider to review     Low back pain      Osteopenia      Panhypopituitarism (H)      Pituitary adenoma (H) 1986    surgeries x 3     Primary pulmonary hypertension (H)      Pulmonary valve disorders      S/P pulmonary valve replacement with bioprosthetic valve 2006    and pericardial patch     Sciatica        CURRENT MEDICATIONS:  Current Outpatient Medications   Medication Sig Dispense Refill     AMOXICILLIN PO Take 1,000 mg by mouth TAKES BEFORE DENTAL VISIT       aspirin 81 MG EC tablet Take 1 tablet (81 mg) by mouth daily 90 tablet 3     CALCIUM + D 600-200 MG-IU OR TABS 1 TABLET DAILY 0 0     CORTEF 10 MG OR TABS 1 tab po alternating with 1.5 tabs po qday 0 0     drospirenone-ethinyl estradiol (LORYNA) 3-0.02 MG per tablet Take 1 tablet by mouth daily       LANsoprazole (PREVACID) 30 MG capsule Take 30 mg by mouth daily       Probiotic Product (PROBIOTIC DAILY PO) Take by mouth daily       SYNTHROID 112 MCG OR TABS 100 mcg daily  (Patient taking differently: 100 mcg daily ) 0 0     fish oil-omega-3 fatty acids 1000 MG capsule Take 1 g by mouth daily (Patient not taking: Reported on 12/16/2021)         PAST SURGICAL HISTORY:  Past Surgical History:   Procedure Laterality Date     C ANESTH,UGI ENDOSCOPY  3/04    Dr Bettencourt/ hiatal hernia     C APPENDECTOMY  1999    [place]     C EXCIS PITUITARY,TRANSNASAL/SEPTAL  1988/90/92    adenoma pituitary     C REPAIR PULMONARY ART STEN  1975    5 months     C REPLACEMENT,  PULMONARY VALVE  1/3/2006    [place]     ENDOSCOPIC INJECTION/IMPLANT  4/2005    injection into EGD area     HC RADIATION THERAPY SPECIAL TREATMENT PROCEDURE  1992    5 weeks to the pituitary area     HC TOOTH EXTRACTION W/FORCEP  age 16    2 teeth surgeries     ZZHC COLONOSCOPY THRU STOMA, DIAGNOSTIC  3/04       ALLERGIES  Ancef [cefazolin], Codeine, Nitrofuran derivatives, and Sulfa drugs    FAMILY HX:  Family History   Problem Relation Age of Onset     Coronary Artery Disease Sister      Allergies No family hx of      Cancer No family hx of      Diabetes No family hx of      Lipids No family hx of        SOCIAL HX:  Social History     Socioeconomic History     Marital status:      Spouse name: None     Number of children: 0     Years of education: 16     Highest education level: None   Occupational History     Occupation: rn     Employer: [employer]     Comment: Labor and delivery     Employer: [employer]   Tobacco Use     Smoking status: Never Smoker     Smokeless tobacco: Never Used   Substance and Sexual Activity     Alcohol use: No     Drug use: No     Sexual activity: Not Currently   Other Topics Concern      Service No     Blood Transfusions No     Caffeine Concern No     Comment: 1 coffee a day, maybe 1 soda a week     Occupational Exposure Yes     Hobby Hazards No     Sleep Concern No     Stress Concern No     Weight Concern No     Special Diet No     Back Care No     Exercise No     Bike Helmet No     Seat Belt Yes     Self-Exams No     Parent/sibling w/ CABG, MI or angioplasty before 65F 55M? Not Asked   Social History Narrative     None     Social Determinants of Health     Financial Resource Strain: Not on file   Food Insecurity: Not on file   Transportation Needs: Not on file   Physical Activity: Not on file   Stress: Not on file   Social Connections: Not on file   Intimate Partner Violence: Not on file   Housing Stability: Not on file       ROS:  Constitutional: No fever,  "chills, or sweats. No weight gain/loss.   ENT: No visual disturbance, ear ache, epistaxis, sore throat.   Allergies/Immunologic: Negative.   Respiratory: No cough, hemoptysis.   Cardiovascular: As per HPI.   GI: No nausea, vomiting, hematemesis, melena, or hematochezia.   : No urinary frequency, dysuria, or hematuria.   Integument: Negative.   Psychiatric: Negative.   Neuro: Negative.   Endocrinology: Negative.   Musculoskeletal: No myalgia.    VITAL SIGNS:  /71 (BP Location: Right arm, Cuff Size: Adult Large)   Pulse 83   Ht 1.651 m (5' 5\")   Wt 61.7 kg (136 lb)   BMI 22.63 kg/m    Body mass index is 22.63 kg/m .  Wt Readings from Last 2 Encounters:   12/16/21 61.7 kg (136 lb)   09/11/20 61.2 kg (135 lb)       PHYSICAL EXAM  Giana Unger IS A 46 year old female.in no acute distress.   HEENT: Unremarkable.  Neck: JVP normal.  Carotids +4/4 bilaterally without bruits.  Lungs: CTA.  Cor: RRR. Normal S1 and crisp S2.  There is a soft systolic murmur left USB.  Extremities: No C/C/E.    Neuro: Grossly intact      LABS    Lab Results   Component Value Date    WBC 11.7 12/16/2021    WBC 15.5 01/12/2006     Lab Results   Component Value Date    RBC 4.85 12/16/2021    RBC 4.52 01/12/2006     Lab Results   Component Value Date    HGB 13.1 12/16/2021    HGB 11.9 01/12/2006     Lab Results   Component Value Date    HCT 41.2 12/16/2021    HCT 37.8 01/12/2006     No components found for: MCT  Lab Results   Component Value Date    MCV 85 12/16/2021    MCV 83.5 01/12/2006     Lab Results   Component Value Date    MCH 27.0 12/16/2021    MCH 26.4 01/12/2006     Lab Results   Component Value Date    MCHC 31.8 12/16/2021    MCHC 31.5 01/12/2006     Lab Results   Component Value Date    RDW 12.5 12/16/2021     Lab Results   Component Value Date     12/16/2021     01/12/2006      Recent Labs   Lab Test 12/16/21  0839      POTASSIUM 4.2   CHLORIDE 107   CO2 26   ANIONGAP 5   GLC 76   BUN 13   CR 1.14* "   MCKENNA 8.9     Recent Labs   Lab Test 12/16/21  0839   CHOL 211*   HDL 71   *   TRIG 102   NHDL 140*        IMPRESSION, REPORT, PLAN:   1.  Infundibular pulmonary valve stenosis, status post initial surgery at 5 months of age at [facility] in [place], [place].  Working on getting this operative report.   2.  Progression to severe pulmonary valve regurgitation with RV enlargement, status post pulmonary valve replacement on 01/03/2006 by Dr. Villegas at [facility] with a 25 mm Megan-Smith valve and pericardial patch enlargement.  Valve stable with mean gradient of 13-14 mmHg. Normal RV size and function  3.  Low TSH with normal T4, 12/2021  4.  History of a pituitary adenoma, status post surgery at 12, 15 and 16 years of age with radiation at 16 years of age, followed closely by Endocrinology.  On cortisone, estrogen and Synthroid.   5.  Mild hyperlipidemia with an LDL of 137 and an HDL of 65, not on treatment.   6.  Borderline A1c in 10/2019 at 6.1, followed by Endocrinology.      DISCUSSION:  It was a pleasure being involved in the care of Ms. Unger.     She is currently taking antibiotics before the dentist and is on a baby ASA.  Echo is stable. She has close follow up with her primary.  She is not having any concerning symptoms.      Plan follow up in a year with an echo.  She understands and is in agreement with the plan as outlined.  All questions were answered.          LIZET ROSSI MD   53 minutes face-face, documentation and review of records on day of visit

## 2021-12-16 NOTE — NURSING NOTE
Cardiac Testing: Patient given instructions regarding  echocardiogram . Discussed purpose, preparation, procedure and when to expect results reported back to the patient. Patient demonstrated understanding of this information and agreed to call with further questions or concerns.    Diet: Patient instructed regarding a heart healthy diet, including discussion of reduced fat and sodium intake. Patient demonstrated understanding of this information and agreed to call with further questions or concerns.    Med Reconcile: Reviewed and verified all current medications with the patient. The updated medication list was printed and given to the patient.    Return Appointment: Patient given instructions regarding scheduling next clinic visit. Patient demonstrated understanding of this information and agreed to call with further questions or concerns.    Patient stated she understood all health information given and agreed to call with further questions or concerns.    Giana Saldivar, MSN, RN, CNL  Cardiology Care Coordinator  [facility]  [phone]

## 2022-01-08 ENCOUNTER — HEALTH MAINTENANCE LETTER (OUTPATIENT)
Age: 47
End: 2022-01-08

## 2022-03-01 ENCOUNTER — APPOINTMENT (OUTPATIENT)
Dept: URBAN - METROPOLITAN AREA CLINIC 260 | Age: 47
Setting detail: DERMATOLOGY
End: 2022-03-02

## 2022-03-01 VITALS — HEIGHT: 65 IN | WEIGHT: 125 LBS

## 2022-03-01 DIAGNOSIS — L81.4 OTHER MELANIN HYPERPIGMENTATION: ICD-10-CM

## 2022-03-01 DIAGNOSIS — L82.0 INFLAMED SEBORRHEIC KERATOSIS: ICD-10-CM

## 2022-03-01 DIAGNOSIS — Z71.89 OTHER SPECIFIED COUNSELING: ICD-10-CM

## 2022-03-01 DIAGNOSIS — D22 MELANOCYTIC NEVI: ICD-10-CM

## 2022-03-01 DIAGNOSIS — Z85.828 PERSONAL HISTORY OF OTHER MALIGNANT NEOPLASM OF SKIN: ICD-10-CM

## 2022-03-01 DIAGNOSIS — L57.8 OTHER SKIN CHANGES DUE TO CHRONIC EXPOSURE TO NONIONIZING RADIATION: ICD-10-CM

## 2022-03-01 PROBLEM — D22.5 MELANOCYTIC NEVI OF TRUNK: Status: ACTIVE | Noted: 2022-03-01

## 2022-03-01 PROCEDURE — OTHER MIPS QUALITY: OTHER

## 2022-03-01 PROCEDURE — OTHER COUNSELING: OTHER

## 2022-03-01 PROCEDURE — 99213 OFFICE O/P EST LOW 20 MIN: CPT

## 2022-03-01 PROCEDURE — OTHER PRESCRIPTION: OTHER

## 2022-03-01 PROCEDURE — OTHER COSMETIC CONSULTATION: SKIN CARE PRODUCTS AND SERVICES: OTHER

## 2022-03-01 PROCEDURE — OTHER DEFER: OTHER

## 2022-03-01 RX ORDER — TRETIONIN 0.25 MG/G
CREAM TOPICAL QHS
Qty: 45 | Refills: 2 | Status: ERX | COMMUNITY
Start: 2022-03-01

## 2022-03-01 ASSESSMENT — LOCATION SIMPLE DESCRIPTION DERM
LOCATION SIMPLE: UPPER BACK
LOCATION SIMPLE: RIGHT CHEEK
LOCATION SIMPLE: LEFT ANTERIOR NECK
LOCATION SIMPLE: RIGHT FOREARM
LOCATION SIMPLE: NOSE
LOCATION SIMPLE: LEFT FOREHEAD

## 2022-03-01 ASSESSMENT — LOCATION DETAILED DESCRIPTION DERM
LOCATION DETAILED: RIGHT PROXIMAL DORSAL FOREARM
LOCATION DETAILED: RIGHT SUPERIOR CENTRAL MALAR CHEEK
LOCATION DETAILED: LEFT INFERIOR ANTERIOR NECK
LOCATION DETAILED: INFERIOR THORACIC SPINE
LOCATION DETAILED: NASAL SUPRATIP
LOCATION DETAILED: LEFT MEDIAL FOREHEAD

## 2022-03-01 ASSESSMENT — LOCATION ZONE DERM
LOCATION ZONE: NOSE
LOCATION ZONE: FACE
LOCATION ZONE: ARM
LOCATION ZONE: NECK
LOCATION ZONE: TRUNK

## 2022-03-01 NOTE — PROCEDURE: DEFER
Introduction Text (Please End With A Colon): The following was deferred:  biopsy deferred until patient returns from lengthy upcoming work trip
Detail Level: Detailed
Procedure To Be Performed At Next Visit: Liquid nitrogen

## 2022-03-01 NOTE — PROCEDURE: COUNSELING
Detail Level: Zone
Detail Level: Generalized
Sunscreen Recommendations: SPF 30-50 daily, year round on face.  \\nBody sunscreen as needed if sun exposure anticipated,  Reapply every 90 minutes if exposure is prolonged.
Detail Level: Simple
Nicotinamide Supplementation Recommendations: 500mg bid

## 2022-03-05 ENCOUNTER — HEALTH MAINTENANCE LETTER (OUTPATIENT)
Age: 47
End: 2022-03-05

## 2022-04-04 ENCOUNTER — APPOINTMENT (OUTPATIENT)
Dept: URBAN - METROPOLITAN AREA CLINIC 260 | Age: 47
Setting detail: DERMATOLOGY
End: 2022-04-05

## 2022-04-04 VITALS — HEIGHT: 65 IN | RESPIRATION RATE: 16 BRPM | WEIGHT: 125 LBS

## 2022-04-04 DIAGNOSIS — L57.8 OTHER SKIN CHANGES DUE TO CHRONIC EXPOSURE TO NONIONIZING RADIATION: ICD-10-CM

## 2022-04-04 DIAGNOSIS — L82.0 INFLAMED SEBORRHEIC KERATOSIS: ICD-10-CM

## 2022-04-04 DIAGNOSIS — L81.4 OTHER MELANIN HYPERPIGMENTATION: ICD-10-CM

## 2022-04-04 PROCEDURE — OTHER COSMETIC CONSULTATION: SKIN CARE PRODUCTS AND SERVICES: OTHER

## 2022-04-04 PROCEDURE — OTHER ADDITIONAL NOTES: OTHER

## 2022-04-04 PROCEDURE — OTHER COUNSELING: OTHER

## 2022-04-04 PROCEDURE — 99212 OFFICE O/P EST SF 10 MIN: CPT | Mod: 25

## 2022-04-04 PROCEDURE — OTHER MIPS QUALITY: OTHER

## 2022-04-04 PROCEDURE — 17110 DESTRUCT B9 LESION 1-14: CPT

## 2022-04-04 PROCEDURE — OTHER LIQUID NITROGEN: OTHER

## 2022-04-04 PROCEDURE — OTHER PRESCRIPTION: OTHER

## 2022-04-04 RX ORDER — TRETIONIN 0.25 MG/G
CREAM TOPICAL QHS
Qty: 45 | Refills: 2 | Status: ERX

## 2022-04-04 ASSESSMENT — LOCATION ZONE DERM
LOCATION ZONE: ARM
LOCATION ZONE: NECK
LOCATION ZONE: FACE

## 2022-04-04 ASSESSMENT — LOCATION DETAILED DESCRIPTION DERM
LOCATION DETAILED: LEFT INFERIOR ANTERIOR NECK
LOCATION DETAILED: RIGHT PROXIMAL DORSAL FOREARM
LOCATION DETAILED: RIGHT SUPERIOR CENTRAL MALAR CHEEK
LOCATION DETAILED: LEFT MEDIAL FOREHEAD

## 2022-04-04 ASSESSMENT — LOCATION SIMPLE DESCRIPTION DERM
LOCATION SIMPLE: LEFT FOREHEAD
LOCATION SIMPLE: LEFT ANTERIOR NECK
LOCATION SIMPLE: RIGHT CHEEK
LOCATION SIMPLE: RIGHT FOREARM

## 2022-04-04 NOTE — PROCEDURE: COUNSELING
Detail Level: Zone
Detail Level: Detailed
Sunscreen Recommendations: SPF 30-50 daily, year round on face.  \\nBody sunscreen as needed if sun exposure anticipated,  Reapply every 90 minutes if exposure is prolonged.

## 2022-04-04 NOTE — PROCEDURE: LIQUID NITROGEN
Render Note In Bullet Format When Appropriate: Yes
Spray Paint Text: The liquid nitrogen was applied to the skin utilizing a spray paint frosting technique.
Include Z78.9 (Other Specified Conditions Influencing Health Status) As An Associated Diagnosis?: No
Medical Necessity Information: It is in your best interest to select a reason for this procedure from the list below. All of these items fulfill various CMS LCD requirements except the new and changing color options.
Medical Necessity Clause: This procedure was medically necessary because the lesions that were treated were:
Detail Level: Detailed
Post-Care Instructions: - Avoid picking at any of the treated lesions.
Consent: - Verbal and written consent was obtained, and risks were reviewed prior to procedure today. \\n- Risks discussed include but are not limited to pain, crusting, scabbing, blistering, scarring, temporary or permanent darker or lighter pigmentary change, recurrence, incomplete resolution, and infection.

## 2022-04-21 ENCOUNTER — TELEPHONE (OUTPATIENT)
Dept: CARDIOLOGY | Facility: CLINIC | Age: 47
End: 2022-04-21
Payer: COMMERCIAL

## 2022-04-21 DIAGNOSIS — Z95.2 S/P PULMONARY VALVE REPLACEMENT: ICD-10-CM

## 2022-04-21 DIAGNOSIS — I37.0 NONRHEUMATIC PULMONARY VALVE STENOSIS: ICD-10-CM

## 2022-04-21 RX ORDER — ASPIRIN 81 MG/1
81 TABLET ORAL DAILY
Qty: 90 TABLET | Refills: 3 | Status: SHIPPED | OUTPATIENT
Start: 2022-04-21 | End: 2023-04-19

## 2022-11-17 ENCOUNTER — HOSPITAL ENCOUNTER (OUTPATIENT)
Dept: BONE DENSITY | Facility: CLINIC | Age: 47
Discharge: HOME | End: 2022-11-17
Attending: INTERNAL MEDICINE | Admitting: INTERNAL MEDICINE
Payer: COMMERCIAL

## 2022-11-17 DIAGNOSIS — M85.80 OSTEOPENIA: ICD-10-CM

## 2022-11-17 PROCEDURE — 77080 DXA BONE DENSITY AXIAL: CPT

## 2022-11-20 ENCOUNTER — HEALTH MAINTENANCE LETTER (OUTPATIENT)
Age: 47
End: 2022-11-20

## 2022-12-15 NOTE — PROCEDURE: MOHS SURGERY
Scheduled    O-L Flap Text: The defect edges were debeveled with a #15 scalpel blade.  Given the location of the defect, shape of the defect and the proximity to free margins an O-L flap was deemed most appropriate.  Using a sterile surgical marker, an appropriate advancement flap was drawn incorporating the defect and placing the expected incisions within the relaxed skin tension lines where possible.    The area thus outlined was incised deep to adipose tissue with a #15 scalpel blade.  The skin margins were undermined to an appropriate distance in all directions utilizing iris scissors.

## 2022-12-20 ENCOUNTER — APPOINTMENT (OUTPATIENT)
Dept: URBAN - METROPOLITAN AREA CLINIC 260 | Age: 47
Setting detail: DERMATOLOGY
End: 2022-12-21

## 2022-12-20 DIAGNOSIS — L82.1 OTHER SEBORRHEIC KERATOSIS: ICD-10-CM

## 2022-12-20 DIAGNOSIS — Z71.89 OTHER SPECIFIED COUNSELING: ICD-10-CM

## 2022-12-20 DIAGNOSIS — L81.4 OTHER MELANIN HYPERPIGMENTATION: ICD-10-CM

## 2022-12-20 DIAGNOSIS — D22 MELANOCYTIC NEVI: ICD-10-CM

## 2022-12-20 DIAGNOSIS — L30.9 DERMATITIS, UNSPECIFIED: ICD-10-CM

## 2022-12-20 DIAGNOSIS — D18.0 HEMANGIOMA: ICD-10-CM
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
## 2022-12-20 DIAGNOSIS — L57.8 OTHER SKIN CHANGES DUE TO CHRONIC EXPOSURE TO NONIONIZING RADIATION: ICD-10-CM

## 2022-12-20 DIAGNOSIS — Z85.828 PERSONAL HISTORY OF OTHER MALIGNANT NEOPLASM OF SKIN: ICD-10-CM

## 2022-12-20 DIAGNOSIS — D49.2 NEOPLASM OF UNSPECIFIED BEHAVIOR OF BONE, SOFT TISSUE, AND SKIN: ICD-10-CM

## 2022-12-20 DIAGNOSIS — L60.3 NAIL DYSTROPHY: ICD-10-CM
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
## 2022-12-20 PROBLEM — D18.01 HEMANGIOMA OF SKIN AND SUBCUTANEOUS TISSUE: Status: ACTIVE | Noted: 2022-12-20

## 2022-12-20 PROBLEM — D22.5 MELANOCYTIC NEVI OF TRUNK: Status: ACTIVE | Noted: 2022-12-20

## 2022-12-20 PROBLEM — D22.62 MELANOCYTIC NEVI OF LEFT UPPER LIMB, INCLUDING SHOULDER: Status: ACTIVE | Noted: 2022-12-20

## 2022-12-20 PROBLEM — D22.61 MELANOCYTIC NEVI OF RIGHT UPPER LIMB, INCLUDING SHOULDER: Status: ACTIVE | Noted: 2022-12-20

## 2022-12-20 PROCEDURE — 99214 OFFICE O/P EST MOD 30 MIN: CPT | Mod: 25

## 2022-12-20 PROCEDURE — OTHER PRESCRIPTION: OTHER

## 2022-12-20 PROCEDURE — OTHER COUNSELING: OTHER

## 2022-12-20 PROCEDURE — OTHER MIPS QUALITY: OTHER

## 2022-12-20 PROCEDURE — OTHER ADDITIONAL NOTES: OTHER

## 2022-12-20 PROCEDURE — 11102 TANGNTL BX SKIN SINGLE LES: CPT

## 2022-12-20 PROCEDURE — OTHER BIOPSY BY SHAVE METHOD: OTHER

## 2022-12-20 PROCEDURE — OTHER PRESCRIPTION MEDICATION MANAGEMENT: OTHER

## 2022-12-20 RX ORDER — TRIAMCINOLONE ACETONIDE 1 MG/G
CREAM TOPICAL TWICE DAILY
Qty: 45 | Refills: 1 | Status: ERX | COMMUNITY
Start: 2022-12-20

## 2022-12-20 ASSESSMENT — LOCATION SIMPLE DESCRIPTION DERM
LOCATION SIMPLE: RIGHT FOREHEAD
LOCATION SIMPLE: RIGHT CALF
LOCATION SIMPLE: UPPER BACK
LOCATION SIMPLE: LEFT POSTERIOR UPPER ARM
LOCATION SIMPLE: RIGHT GREAT TOE
LOCATION SIMPLE: RIGHT FOREARM
LOCATION SIMPLE: CHEST
LOCATION SIMPLE: NOSE
LOCATION SIMPLE: SCALP
LOCATION SIMPLE: POSTERIOR NECK
LOCATION SIMPLE: RIGHT POSTERIOR UPPER ARM

## 2022-12-20 ASSESSMENT — LOCATION DETAILED DESCRIPTION DERM
LOCATION DETAILED: RIGHT PROXIMAL CALF
LOCATION DETAILED: MID POSTERIOR NECK
LOCATION DETAILED: STERNUM
LOCATION DETAILED: INFERIOR THORACIC SPINE
LOCATION DETAILED: NASAL SUPRATIP
LOCATION DETAILED: RIGHT LATERAL FOREHEAD
LOCATION DETAILED: LEFT DISTAL POSTERIOR UPPER ARM
LOCATION DETAILED: RIGHT GREAT TOENAIL
LOCATION DETAILED: RIGHT SUPERIOR FRONTAL SCALP
LOCATION DETAILED: RIGHT DISTAL POSTERIOR UPPER ARM
LOCATION DETAILED: RIGHT DISTAL DORSAL FOREARM
LOCATION DETAILED: RIGHT SUPERIOR FOREHEAD

## 2022-12-20 ASSESSMENT — LOCATION ZONE DERM
LOCATION ZONE: NOSE
LOCATION ZONE: FACE
LOCATION ZONE: TRUNK
LOCATION ZONE: ARM
LOCATION ZONE: LEG
LOCATION ZONE: NECK
LOCATION ZONE: SCALP
LOCATION ZONE: TOENAIL

## 2022-12-20 NOTE — PROCEDURE: MIPS QUALITY
Patient verbalized plan to meet with  to complete.
Quality 226: Preventive Care And Screening: Tobacco Use: Screening And Cessation Intervention: Patient screened for tobacco use and is an ex/non-smoker
Detail Level: Detailed
Quality 110: Preventive Care And Screening: Influenza Immunization: Influenza immunization was not ordered or administered, reason not given
Quality 130: Documentation Of Current Medications In The Medical Record: Current Medications Documented
Quality 431: Preventive Care And Screening: Unhealthy Alcohol Use - Screening: Patient not identified as an unhealthy alcohol user when screened for unhealthy alcohol use using a systematic screening method

## 2022-12-20 NOTE — PROCEDURE: COUNSELING
Detail Level: Detailed
Detail Level: Zone
Detail Level: Simple
Detail Level: Generalized
Nicotinamide Supplementation Recommendations: 500mg bid

## 2022-12-20 NOTE — PROCEDURE: BIOPSY BY SHAVE METHOD
Validate Triangulation: No
Depth Of Biopsy: epidermis
Cryotherapy Text: The wound bed was treated with cryotherapy after the biopsy was performed.
Information: Selecting Yes will display possible errors in your note based on the variables you have selected. This validation is only offered as a suggestion for you. PLEASE NOTE THAT THE VALIDATION TEXT WILL BE REMOVED WHEN YOU FINALIZE YOUR NOTE. IF YOU WANT TO FAX A PRELIMINARY NOTE YOU WILL NEED TO TOGGLE THIS TO 'NO' IF YOU DO NOT WANT IT IN YOUR FAXED NOTE.
Type Of Destruction Used: Curettage
Billing Type: Third-Party Bill
Consent: Written consent was obtained and risks were reviewed including but not limited to scarring, infection, bleeding, scabbing, incomplete removal, nerve damage and allergy to anesthesia.
Curettage Text: The wound bed was treated with curettage after the biopsy was performed.
Notification Instructions: Patient will be notified of biopsy results. However, patient instructed to call the office if not contacted within 2 weeks.
Detail Level: Detailed
Biopsy Type: H and E
Was A Bandage Applied: Yes
X Size Of Lesion In Cm: 0
Path Notes (To The Dermatopathologist): Please check margins
Silver Nitrate Text: The wound bed was treated with silver nitrate after the biopsy was performed.
Electrodesiccation And Curettage Text: The wound bed was treated with electrodesiccation and curettage after the biopsy was performed.
Electrodesiccation Text: The wound bed was treated with electrodesiccation after the biopsy was performed.
Dressing: no dressing applied
Hemostasis: Drysol
Wound Care: No ointment
Biopsy Method: Dermablade
Post-Care Instructions: I reviewed with the patient in detail post-care instructions. Patient is to keep the biopsy site dry overnight, and then apply vaseline and clean bandage daily.

## 2022-12-20 NOTE — PROCEDURE: PRESCRIPTION MEDICATION MANAGEMENT
Initiate Treatment: Triamcinolone cream
Render In Strict Bullet Format?: No
Plan: RTC in 2 weeks if there is no improvement
Detail Level: Simple

## 2022-12-20 NOTE — PROCEDURE: ADDITIONAL NOTES
Additional Notes: Patient stated that if any of these lesions grow or become itchy, she will RTC to have them frozen
Detail Level: Simple
Render Risk Assessment In Note?: no
Additional Notes: Pr reports trauma to the area which caused the entire nail to fall off, it is now growing back. Pt will consider treatment for nail fungus in the future once it has grown back as this is reoccurring

## 2022-12-24 ENCOUNTER — HEALTH MAINTENANCE LETTER (OUTPATIENT)
Age: 47
End: 2022-12-24

## 2023-01-12 ENCOUNTER — HOSPITAL ENCOUNTER (OUTPATIENT)
Dept: CARDIOLOGY | Facility: CLINIC | Age: 48
Discharge: HOME | End: 2023-01-12
Attending: INTERNAL MEDICINE | Admitting: INTERNAL MEDICINE
Payer: COMMERCIAL

## 2023-01-12 ENCOUNTER — OFFICE VISIT (OUTPATIENT)
Dept: CARDIOLOGY | Facility: CLINIC | Age: 48
End: 2023-01-12
Payer: COMMERCIAL

## 2023-01-12 VITALS
SYSTOLIC BLOOD PRESSURE: 112 MMHG | HEART RATE: 62 BPM | DIASTOLIC BLOOD PRESSURE: 70 MMHG | WEIGHT: 137.1 LBS | OXYGEN SATURATION: 98 % | BODY MASS INDEX: 22.84 KG/M2 | HEIGHT: 65 IN

## 2023-01-12 DIAGNOSIS — Q24.3 CONGENITAL INFUNDIBULAR PULMONIC STENOSIS: Primary | ICD-10-CM

## 2023-01-12 DIAGNOSIS — Z95.2 S/P PULMONARY VALVE REPLACEMENT: ICD-10-CM

## 2023-01-12 PROCEDURE — 93325 DOPPLER ECHO COLOR FLOW MAPG: CPT

## 2023-01-12 PROCEDURE — 99214 OFFICE O/P EST MOD 30 MIN: CPT | Performed by: INTERNAL MEDICINE

## 2023-01-12 PROCEDURE — 93320 DOPPLER ECHO COMPLETE: CPT | Mod: 26 | Performed by: PEDIATRICS

## 2023-01-12 PROCEDURE — 93303 ECHO TRANSTHORACIC: CPT

## 2023-01-12 PROCEDURE — 93303 ECHO TRANSTHORACIC: CPT | Mod: 26 | Performed by: PEDIATRICS

## 2023-01-12 PROCEDURE — 93325 DOPPLER ECHO COLOR FLOW MAPG: CPT | Mod: 26 | Performed by: PEDIATRICS

## 2023-01-12 NOTE — PATIENT INSTRUCTIONS
You were seen today in the Adult Congenital and Cardiovascular Genetics Clinic at the [facility].    Cardiology Providers you saw during your visit:  CINDY Conrad MD    Diagnosis:  pulmonary valve replacement    Results:  CINDY Conrad MD reviewed the results of your echo and lab testing today in clinic.    Recommendations for you:    No changes today      General Cardiac Recommendations:  Continue to eat a heart healthy, low salt diet.  Continue to get 20-30 minutes of aerobic activity, 4-5 days per week.  Examples of aerobic activity include walking, running, swimming, cycling, etc.  Continue to observe good oral hygiene, with regular dental visits.      SBE prophylaxis:   Yes__x__  No____    If YES is checked, follow the recommendations outlined below:  Take antibiotic(s) prior to recommended dental procedures and procedures on the respiratory tract or with infected skin, muscle or bones. SBE prophylaxis is not needed for routine GI and  procedures (ie. Colonoscopy or vaginal delivery)  Observe good oral hygiene daily, as advised by your dentist. Get regular professional dental care.  Keep cuts clean.  Infections should be treated promptly.  Symptoms of Infective Endocarditis could include: fever lasting more than 4-5 days or a recurrent fever that initially resolves but returns within 1-2 days)      FASTING CHOLESTEROL was checked in the last 5 years YES__x_  NO___ (2019)      Follow-up:  Follow up with Dr Conrad in 1 year with an echo prior    If you have questions or concerns please contact us at:    Brynn Calderon, RN, BSN   Eli Kumari (Scheduling)  Nurse Care Coordinator     Clinic   Adult Congenital and CV Genetics   Adult Congenital and CV Genetic  [facility]   [facility]  (P) [phone]     (P) [phone]            (F) [phone]        For after hours urgent needs, call [phone] and ask to speak to the  Adult Congenital Physician on call.  Mention Job Code 0401.    For emergencies call 911.    [facility]  Mail Code [id number]  [address] [address]

## 2023-01-12 NOTE — NURSING NOTE
Cardiac Testing: Patient given instructions regarding  echocardiogram . Discussed purpose, preparation, procedure and when to expect results reported back to the patient. Patient demonstrated understanding of this information and agreed to call with further questions or concerns.    Med Reconcile: Reviewed and verified all current medications with the patient. The updated medication list was printed and given to the patient.    Return Appointment: Patient given instructions regarding scheduling next clinic visit. Patient demonstrated understanding of this information and agreed to call with further questions or concerns.    Patient stated she understood all health information given and agreed to call with further questions or concerns.

## 2023-01-12 NOTE — LETTER
1/12/2023    Pamela Pérez MD  [facility] [address] [address]    RE: Giana Unger       Dear Colleague,     I had the pleasure of seeing Giana Unger in the [facility].  CARDIOLOGY CONSULTATION:     Ms. Unger is a delightful, 47-year-old woman that I met in April of 2020 with a past medical history significant for infundibular pulmonary valvular stenosis.   Her initial surgery was at 5 months of age in [place] at [facility] in [place], and we are working on getting that operative report. She followed with Dr. Yovanny Ramey, and due to progressive right ventricular enlargement and dysfunction, she was referred to [facility] in 2006 and underwent valve replacement with a 25 mm Megan-Smith valve and pericardial patch enlargement on 01/03/2006 with a postoperative course that was not complicated.      Ms. Unger has been doing reasonably well from the standpoint of her valve.  She has just mild to moderate pulmonary valve insufficiency.  I reviewed those images personally, and she has no significant valvular stenosis.  On her echo from 01/27/2020, she had a mean gradient across her pulmonary valve of 14 mmHg and that is unchanged on echo 12/16/21.   RV was normal size on MRI 9/2020.     She is an OB nurse.  She has a 14-year-old son and an 8-year-old daughter and a [place] son who is also 14. Her mom was recently diagnosed with BAV and ascending aortic aneurysm at 4.2 CM.  She has 4 siblings.  No early coronary artery disease in the family. She did have a pituitary adenoma that was diagnosed at 12 years of age.  She underwent surgery at 12, 15 and 16 years of age, and at 16, she also received radiation with no subsequent surgeries. She is on estrogen replacement, Synthroid and cortisone 10 mg alternating with 15 mg, followed closely by an endocrinologist. She has had appropriate followup for this condition, including bone mineral  density.  TSH was low on labs today but T4 was normal; she will discuss with her endocrinologist.       Ms. Unger was not on a baby aspirin when I met her, she is now taking that. She does take antibiotics before the dentist.  Lipids are reasonably controlled.   She is exercising regularly     PAST MEDICAL HISTORY:  Past Medical History:   Diagnosis Date     Congenital infundibular pulmonic stenosis      Esophageal reflux      Heart valve replaced 1/12/2006     Problem list name updated by automated process. Provider to review     Low back pain      Osteopenia      Panhypopituitarism (H)      Pituitary adenoma (H) 1986    surgeries x 3     Primary pulmonary hypertension (H)      Pulmonary valve disorders      S/P pulmonary valve replacement with bioprosthetic valve 2006    and pericardial patch     Sciatica        CURRENT MEDICATIONS:  Current Outpatient Medications   Medication Sig Dispense Refill     AMOXICILLIN PO Take 1,000 mg by mouth TAKES BEFORE DENTAL VISIT       aspirin 81 MG EC tablet Take 1 tablet (81 mg) by mouth daily 90 tablet 3     CALCIUM + D 600-200 MG-IU OR TABS 1 TABLET DAILY 0 0     CORTEF 10 MG OR TABS 1 tab po alternating with 1.5 tabs po qday 0 0     drospirenone-ethinyl estradiol (DENVER) 3-0.02 MG tablet Take 1 tablet by mouth daily       LANsoprazole (PREVACID) 30 MG capsule Take 30 mg by mouth daily       Probiotic Product (PROBIOTIC DAILY PO) Take by mouth daily       SYNTHROID 112 MCG OR TABS 100 mcg daily  (Patient taking differently: 100 mcg daily ) 0 0       PAST SURGICAL HISTORY:  Past Surgical History:   Procedure Laterality Date     C ANESTH,UGI ENDOSCOPY  3/04    Dr Bettencourt/ hiatal hernia     ENDOSCOPIC INJECTION/IMPLANT  4/2005    injection into EGD area     HC RADIATION THERAPY SPECIAL TREATMENT PROCEDURE  1992    5 weeks to the pituitary area     HC TOOTH EXTRACTION W/FORCEP  age 16    2 teeth surgeries     [facility] APPENDECTOMY  1999    [facility] EXCIS  "PITUITARY,TRANSNASAL/SEPTAL  1988/90/92    adenoma pituitary     ZZC REPAIR PULMONARY ART STEN  1975    5 months     ZZC REPLACEMENT, PULMONARY VALVE  1/3/2006    [facility] COLONOSCOPY THRU STOMA, DIAGNOSTIC  3/04       ALLERGIES  Ancef [cefazolin], Codeine, Nitrofuran derivatives, and Sulfa drugs    FAMILY HX:  Family History   Problem Relation Age of Onset     Coronary Artery Disease Sister      Allergies No family hx of      Cancer No family hx of      Diabetes No family hx of      Lipids No family hx of        SOCIAL HX:  Social History     Socioeconomic History     Marital status:      Spouse name: None     Number of children: 0     Years of education: 16     Highest education level: None   Occupational History     Occupation: rn     Employer: [employer]     Comment: Labor and delivery     Employer: [employer]   Tobacco Use     Smoking status: Never     Smokeless tobacco: Never   Substance and Sexual Activity     Alcohol use: No     Drug use: No     Sexual activity: Not Currently   Other Topics Concern      Service No     Blood Transfusions No     Caffeine Concern No     Comment: 1 coffee a day, maybe 1 soda a week     Occupational Exposure Yes     Hobby Hazards No     Sleep Concern No     Stress Concern No     Weight Concern No     Special Diet No     Back Care No     Exercise No     Bike Helmet No     Seat Belt Yes     Self-Exams No       ROS:  Constitutional: No fever, chills, or sweats. No weight gain/loss.   ENT: No visual disturbance, ear ache, epistaxis, sore throat.   Allergies/Immunologic: Negative.   Respiratory: No cough, hemoptysis.   Cardiovascular: As per HPI.   GI: No nausea, vomiting, hematemesis, melena, or hematochezia.   : No urinary frequency, dysuria, or hematuria.   Integument: Negative.   Psychiatric: Negative.   Neuro: Negative.   Endocrinology: Negative.   Musculoskeletal: No myalgia.    VITAL SIGNS:  /70   Pulse 62   Ht 1.651 m (5' 5\")   Wt " 62.2 kg (137 lb 1.6 oz)   SpO2 98%   BMI 22.81 kg/m    Body mass index is 22.81 kg/m .  Wt Readings from Last 2 Encounters:   01/12/23 62.2 kg (137 lb 1.6 oz)   12/16/21 61.7 kg (136 lb)       PHYSICAL EXAM  Giana Unger IS A 47 year old female.in no acute distress.  HEENT: Unremarkable.  Neck: JVP normal.  Carotids +4/4 bilaterally without bruits.  Lungs: CTA.  Cor: RRR. Normal S1 and crisp S2.  There is a soft systolic murmur left USB.  Extremities: No C/C/E.    Neuro: Grossly intact  LABS    Lab Results   Component Value Date    WBC 11.7 12/16/2021    WBC 15.5 01/12/2006     Lab Results   Component Value Date    RBC 4.85 12/16/2021    RBC 4.52 01/12/2006     Lab Results   Component Value Date    HGB 13.1 12/16/2021    HGB 11.9 01/12/2006     Lab Results   Component Value Date    HCT 41.2 12/16/2021    HCT 37.8 01/12/2006     No components found for: MCT  Lab Results   Component Value Date    MCV 85 12/16/2021    MCV 83.5 01/12/2006     Lab Results   Component Value Date    MCH 27.0 12/16/2021    MCH 26.4 01/12/2006     Lab Results   Component Value Date    MCHC 31.8 12/16/2021    MCHC 31.5 01/12/2006     Lab Results   Component Value Date    RDW 12.5 12/16/2021     Lab Results   Component Value Date     12/16/2021     01/12/2006      Recent Labs   Lab Test 12/16/21  0839      POTASSIUM 4.2   CHLORIDE 107   CO2 26   ANIONGAP 5   GLC 76   BUN 13   CR 1.14*   MCKENNA 8.9     Recent Labs   Lab Test 12/16/21  0839   CHOL 211*   HDL 71   *   TRIG 102   NHDL 140*           IMPRESSION, REPORT, PLAN:   1.  Infundibular pulmonary valve stenosis, status post initial surgery at 5 months of age at [facility] in [place], [place].  Working on getting this operative report.   2.  Progression to severe pulmonary valve regurgitation with RV enlargement, status post pulmonary valve replacement on 01/03/2006 by Dr. Villegas at [facility] with a 25 mm Megan-Smith valve and pericardial patch  enlargement.  Valve stable with mean gradient of 13-14 mmHg. Normal RV size and function  3.  Low TSH with normal T4, 12/2021  4.  History of a pituitary adenoma, status post surgery at 12, 15 and 16 years of age with radiation at 16 years of age, followed closely by Endocrinology.  On cortisone, estrogen and Synthroid.   5.  Mild hyperlipidemia with an LDL of 137 and an HDL of 65, not on treatment.   6.  Borderline A1c in 10/2019 at 6.1, followed by Endocrinology.      DISCUSSION:  It was a pleasure being involved in the care of Ms. Unger.      She is currently taking antibiotics before the dentist and is on a baby ASA.  Echo is stable. She has close follow up with her primary.  She is not having any concerning symptoms.      Plan follow up in a year with an echo.  She understands and is in agreement with the plan as outlined.  All questions were answered.        LIZET CONRAD MD   30 minutes face-face, documentation and review of records on day of visit    Thank you for allowing me to participate in the care of your patient.      Sincerely,     Lizet Conrad MD     [facility]  cc:   No referring provider defined for this encounter.

## 2023-01-12 NOTE — PROGRESS NOTES
CARDIOLOGY CONSULTATION:     Ms. Unger is a delightful, 47-year-old woman that I met in April of 2020 with a past medical history significant for infundibular pulmonary valvular stenosis.   Her initial surgery was at 5 months of age in [place] at [facility] in [place], and we are working on getting that operative report. She followed with Dr. Yovanny Ramey, and due to progressive right ventricular enlargement and dysfunction, she was referred to [facility] in 2006 and underwent valve replacement with a 25 mm Megan-Smith valve and pericardial patch enlargement on 01/03/2006 with a postoperative course that was not complicated.      Ms. Unger has been doing reasonably well from the standpoint of her valve.  She has just mild to moderate pulmonary valve insufficiency.  I reviewed those images personally, and she has no significant valvular stenosis.  On her echo from 01/27/2020, she had a mean gradient across her pulmonary valve of 14 mmHg and that is unchanged on echo 12/16/21.   RV was normal size on MRI 9/2020.     She is an OB nurse.  She has a 14-year-old son and an 8-year-old daughter and a [place] son who is also 14. Her mom was recently diagnosed with BAV and ascending aortic aneurysm at 4.2 CM.  She has 4 siblings.  No early coronary artery disease in the family. She did have a pituitary adenoma that was diagnosed at 12 years of age.  She underwent surgery at 12, 15 and 16 years of age, and at 16, she also received radiation with no subsequent surgeries. She is on estrogen replacement, Synthroid and cortisone 10 mg alternating with 15 mg, followed closely by an endocrinologist. She has had appropriate followup for this condition, including bone mineral density.  TSH was low on labs today but T4 was normal; she will discuss with her endocrinologist.       Ms. Unger was not on a baby aspirin when I met her, she is now taking that. She does take antibiotics before the dentist.  Lipids are  reasonably controlled.   She is exercising regularly     PAST MEDICAL HISTORY:  Past Medical History:   Diagnosis Date     Congenital infundibular pulmonic stenosis      Esophageal reflux      Heart valve replaced 1/12/2006     Problem list name updated by automated process. Provider to review     Low back pain      Osteopenia      Panhypopituitarism (H)      Pituitary adenoma (H) 1986    surgeries x 3     Primary pulmonary hypertension (H)      Pulmonary valve disorders      S/P pulmonary valve replacement with bioprosthetic valve 2006    and pericardial patch     Sciatica        CURRENT MEDICATIONS:  Current Outpatient Medications   Medication Sig Dispense Refill     AMOXICILLIN PO Take 1,000 mg by mouth TAKES BEFORE DENTAL VISIT       aspirin 81 MG EC tablet Take 1 tablet (81 mg) by mouth daily 90 tablet 3     CALCIUM + D 600-200 MG-IU OR TABS 1 TABLET DAILY 0 0     CORTEF 10 MG OR TABS 1 tab po alternating with 1.5 tabs po qday 0 0     drospirenone-ethinyl estradiol (DENVER) 3-0.02 MG tablet Take 1 tablet by mouth daily       LANsoprazole (PREVACID) 30 MG capsule Take 30 mg by mouth daily       Probiotic Product (PROBIOTIC DAILY PO) Take by mouth daily       SYNTHROID 112 MCG OR TABS 100 mcg daily  (Patient taking differently: 100 mcg daily ) 0 0       PAST SURGICAL HISTORY:  Past Surgical History:   Procedure Laterality Date     C ANESTH,UGI ENDOSCOPY  3/04    Dr Bettencourt/ hiatal hernia     ENDOSCOPIC INJECTION/IMPLANT  4/2005    injection into EGD area      RADIATION THERAPY SPECIAL TREATMENT PROCEDURE  1992    5 weeks to the pituitary area     HC TOOTH EXTRACTION W/FORCEP  age 16    2 teeth surgeries     ZZC APPENDECTOMY  1999    [facility] EXCIS PITUITARY,TRANSNASAL/SEPTAL  1988/90/92    adenoma pituitary     ZZC REPAIR PULMONARY ART STEN  1975    5 months     [facility] REPLACEMENT, PULMONARY VALVE  1/3/2006    [facility] COLONOSCOPY THRU STOMA, DIAGNOSTIC  3/04       ALLERGIES  Ancef [cefazolin], Codeine,  "Nitrofuran derivatives, and Sulfa drugs    FAMILY HX:  Family History   Problem Relation Age of Onset     Coronary Artery Disease Sister      Allergies No family hx of      Cancer No family hx of      Diabetes No family hx of      Lipids No family hx of        SOCIAL HX:  Social History     Socioeconomic History     Marital status:      Spouse name: None     Number of children: 0     Years of education: 16     Highest education level: None   Occupational History     Occupation: rn     Employer: [employer]     Comment: Labor and delivery     Employer: [employer]   Tobacco Use     Smoking status: Never     Smokeless tobacco: Never   Substance and Sexual Activity     Alcohol use: No     Drug use: No     Sexual activity: Not Currently   Other Topics Concern      Service No     Blood Transfusions No     Caffeine Concern No     Comment: 1 coffee a day, maybe 1 soda a week     Occupational Exposure Yes     Hobby Hazards No     Sleep Concern No     Stress Concern No     Weight Concern No     Special Diet No     Back Care No     Exercise No     Bike Helmet No     Seat Belt Yes     Self-Exams No       ROS:  Constitutional: No fever, chills, or sweats. No weight gain/loss.   ENT: No visual disturbance, ear ache, epistaxis, sore throat.   Allergies/Immunologic: Negative.   Respiratory: No cough, hemoptysis.   Cardiovascular: As per HPI.   GI: No nausea, vomiting, hematemesis, melena, or hematochezia.   : No urinary frequency, dysuria, or hematuria.   Integument: Negative.   Psychiatric: Negative.   Neuro: Negative.   Endocrinology: Negative.   Musculoskeletal: No myalgia.    VITAL SIGNS:  /70   Pulse 62   Ht 1.651 m (5' 5\")   Wt 62.2 kg (137 lb 1.6 oz)   SpO2 98%   BMI 22.81 kg/m    Body mass index is 22.81 kg/m .  Wt Readings from Last 2 Encounters:   01/12/23 62.2 kg (137 lb 1.6 oz)   12/16/21 61.7 kg (136 lb)       PHYSICAL EXAM  Giana Unger IS A 47 year old female.in no acute " distress.  HEENT: Unremarkable.  Neck: JVP normal.  Carotids +4/4 bilaterally without bruits.  Lungs: CTA.  Cor: RRR. Normal S1 and crisp S2.  There is a soft systolic murmur left USB.  Extremities: No C/C/E.    Neuro: Grossly intact  LABS    Lab Results   Component Value Date    WBC 11.7 12/16/2021    WBC 15.5 01/12/2006     Lab Results   Component Value Date    RBC 4.85 12/16/2021    RBC 4.52 01/12/2006     Lab Results   Component Value Date    HGB 13.1 12/16/2021    HGB 11.9 01/12/2006     Lab Results   Component Value Date    HCT 41.2 12/16/2021    HCT 37.8 01/12/2006     No components found for: MCT  Lab Results   Component Value Date    MCV 85 12/16/2021    MCV 83.5 01/12/2006     Lab Results   Component Value Date    MCH 27.0 12/16/2021    MCH 26.4 01/12/2006     Lab Results   Component Value Date    MCHC 31.8 12/16/2021    MCHC 31.5 01/12/2006     Lab Results   Component Value Date    RDW 12.5 12/16/2021     Lab Results   Component Value Date     12/16/2021     01/12/2006      Recent Labs   Lab Test 12/16/21  0839      POTASSIUM 4.2   CHLORIDE 107   CO2 26   ANIONGAP 5   GLC 76   BUN 13   CR 1.14*   MCKENNA 8.9     Recent Labs   Lab Test 12/16/21  0839   CHOL 211*   HDL 71   *   TRIG 102   NHDL 140*           IMPRESSION, REPORT, PLAN:   1.  Infundibular pulmonary valve stenosis, status post initial surgery at 5 months of age at [facility] in [place], [place].  Working on getting this operative report.   2.  Progression to severe pulmonary valve regurgitation with RV enlargement, status post pulmonary valve replacement on 01/03/2006 by Dr. Villegas at [facility] with a 25 mm Megan-Msith valve and pericardial patch enlargement.  Valve stable with mean gradient of 13-14 mmHg. Normal RV size and function  3.  Low TSH with normal T4, 12/2021  4.  History of a pituitary adenoma, status post surgery at 12, 15 and 16 years of age with radiation at 16 years of age, followed closely  by Endocrinology.  On cortisone, estrogen and Synthroid.   5.  Mild hyperlipidemia with an LDL of 137 and an HDL of 65, not on treatment.   6.  Borderline A1c in 10/2019 at 6.1, followed by Endocrinology.      DISCUSSION:  It was a pleasure being involved in the care of Ms. Unger.      She is currently taking antibiotics before the dentist and is on a baby ASA.  Echo is stable. She has close follow up with her primary.  She is not having any concerning symptoms.      Plan follow up in a year with an echo.  She understands and is in agreement with the plan as outlined.  All questions were answered.        LIZET ROSSI MD   30 minutes face-face, documentation and review of records on day of visit

## 2023-04-15 ENCOUNTER — HEALTH MAINTENANCE LETTER (OUTPATIENT)
Age: 48
End: 2023-04-15

## 2023-04-17 DIAGNOSIS — I37.0 NONRHEUMATIC PULMONARY VALVE STENOSIS: ICD-10-CM

## 2023-04-17 DIAGNOSIS — Z95.2 S/P PULMONARY VALVE REPLACEMENT: ICD-10-CM

## 2023-04-17 RX ORDER — ASPIRIN 81 MG/1
81 TABLET ORAL DAILY
Qty: 90 TABLET | Refills: 3 | Status: CANCELLED | OUTPATIENT
Start: 2023-04-17

## 2023-04-19 ENCOUNTER — TELEPHONE (OUTPATIENT)
Dept: CARDIOLOGY | Facility: CLINIC | Age: 48
End: 2023-04-19
Payer: COMMERCIAL

## 2023-04-19 DIAGNOSIS — I37.0 NONRHEUMATIC PULMONARY VALVE STENOSIS: ICD-10-CM

## 2023-04-19 DIAGNOSIS — Z95.2 S/P PULMONARY VALVE REPLACEMENT: ICD-10-CM

## 2023-04-19 RX ORDER — ASPIRIN 81 MG/1
81 TABLET ORAL DAILY
Qty: 90 TABLET | Refills: 3 | Status: SHIPPED | OUTPATIENT
Start: 2023-04-19 | End: 2024-04-15

## 2023-04-19 NOTE — TELEPHONE ENCOUNTER
Pt calling in regards to getting refill of RX Asprin. Pt stated she works through [facility] [employer] and they have reached out and got no response.

## 2023-08-09 ENCOUNTER — APPOINTMENT (OUTPATIENT)
Dept: URBAN - METROPOLITAN AREA CLINIC 260 | Age: 48
Setting detail: DERMATOLOGY
End: 2023-08-09

## 2023-08-09 VITALS — HEIGHT: 65 IN | WEIGHT: 135 LBS

## 2023-08-09 DIAGNOSIS — L82.0 INFLAMED SEBORRHEIC KERATOSIS: ICD-10-CM

## 2023-08-09 PROCEDURE — OTHER COUNSELING: OTHER

## 2023-08-09 PROCEDURE — 17110 DESTRUCT B9 LESION 1-14: CPT

## 2023-08-09 PROCEDURE — OTHER LIQUID NITROGEN: OTHER

## 2023-08-09 ASSESSMENT — LOCATION SIMPLE DESCRIPTION DERM: LOCATION SIMPLE: RIGHT FOREARM

## 2023-08-09 ASSESSMENT — LOCATION DETAILED DESCRIPTION DERM: LOCATION DETAILED: RIGHT PROXIMAL DORSAL FOREARM

## 2023-08-09 ASSESSMENT — LOCATION ZONE DERM: LOCATION ZONE: ARM

## 2023-08-09 NOTE — PROCEDURE: COUNSELING
Detail Level: Detailed
Patient Specific Counseling (Will Not Stick From Patient To Patient): - Seborrheic keratoses are benign growths.\\n- Patients get more of them as they age, and these may grow slowly over time.\\n- Some seborrheic keratoses (particularly larger lesions) require multiple treatments.\\n- Return to clinic should any treated growths not be resolved within 4 weeks.

## 2023-08-09 NOTE — PROCEDURE: LIQUID NITROGEN
Medical Necessity Clause: This procedure was medically necessary because the lesions that were treated were:
Detail Level: Detailed
Post-Care Instructions: - Avoid picking at any of the treated lesions.\\n- Blisters should not be popped. However should a blister rupture, cover it with Vaseline ointment or Aquaphor and a bandage until healed.
Medical Necessity Information: It is in your best interest to select a reason for this procedure from the list below. All of these items fulfill various CMS LCD requirements except the new and changing color options.
Consent: - Consent was obtained and risks were reviewed prior to procedure today. All questions were answered prior to procedure today.\\n- Risks discussed include but are not limited to pain, crusting, scabbing, blistering, scarring, temporary or permanent darker or lighter pigmentary change, recurrence, incomplete resolution, and infection.
Show Topical Anesthesia Variable?: Yes
Spray Paint Text: The liquid nitrogen was applied to the skin utilizing a spray paint frosting technique.
Spray Paint Technique: No
Application Tool (Optional): Liquid Nitrogen Sprayer

## 2023-10-18 ENCOUNTER — APPOINTMENT (OUTPATIENT)
Dept: URBAN - METROPOLITAN AREA CLINIC 260 | Age: 48
Setting detail: DERMATOLOGY
End: 2023-10-18

## 2023-10-18 VITALS — HEIGHT: 65 IN | WEIGHT: 139 LBS

## 2023-10-18 DIAGNOSIS — Z71.89 OTHER SPECIFIED COUNSELING: ICD-10-CM

## 2023-10-18 DIAGNOSIS — D18.0 HEMANGIOMA: ICD-10-CM

## 2023-10-18 DIAGNOSIS — D22 MELANOCYTIC NEVI: ICD-10-CM

## 2023-10-18 DIAGNOSIS — L21.8 OTHER SEBORRHEIC DERMATITIS: ICD-10-CM

## 2023-10-18 DIAGNOSIS — L57.8 OTHER SKIN CHANGES DUE TO CHRONIC EXPOSURE TO NONIONIZING RADIATION: ICD-10-CM

## 2023-10-18 DIAGNOSIS — L82.1 OTHER SEBORRHEIC KERATOSIS: ICD-10-CM

## 2023-10-18 DIAGNOSIS — L81.4 OTHER MELANIN HYPERPIGMENTATION: ICD-10-CM

## 2023-10-18 PROBLEM — D22.5 MELANOCYTIC NEVI OF TRUNK: Status: ACTIVE | Noted: 2023-10-18

## 2023-10-18 PROBLEM — D18.01 HEMANGIOMA OF SKIN AND SUBCUTANEOUS TISSUE: Status: ACTIVE | Noted: 2023-10-18

## 2023-10-18 PROBLEM — D22.72 MELANOCYTIC NEVI OF LEFT LOWER LIMB, INCLUDING HIP: Status: ACTIVE | Noted: 2023-10-18

## 2023-10-18 PROCEDURE — OTHER SUNSCREEN RECOMMENDATIONS: OTHER

## 2023-10-18 PROCEDURE — OTHER COUNSELING: OTHER

## 2023-10-18 PROCEDURE — OTHER PRESCRIPTION: OTHER

## 2023-10-18 PROCEDURE — 99214 OFFICE O/P EST MOD 30 MIN: CPT

## 2023-10-18 PROCEDURE — OTHER PRESCRIPTION MEDICATION MANAGEMENT: OTHER

## 2023-10-18 PROCEDURE — OTHER MIPS QUALITY: OTHER

## 2023-10-18 RX ORDER — BETAMETHASONE DIPROPIONATE 0.5 MG/ML
LOTION TOPICAL BID
Qty: 60 | Refills: 6 | Status: ERX | COMMUNITY
Start: 2023-10-18

## 2023-10-18 ASSESSMENT — LOCATION DETAILED DESCRIPTION DERM
LOCATION DETAILED: RIGHT SUPERIOR MEDIAL UPPER BACK
LOCATION DETAILED: INFERIOR THORACIC SPINE
LOCATION DETAILED: LEFT LATERAL DORSAL FOOT
LOCATION DETAILED: RIGHT INFERIOR MEDIAL UPPER BACK
LOCATION DETAILED: RIGHT MEDIAL UPPER BACK

## 2023-10-18 ASSESSMENT — LOCATION ZONE DERM
LOCATION ZONE: FEET
LOCATION ZONE: TRUNK

## 2023-10-18 ASSESSMENT — LOCATION SIMPLE DESCRIPTION DERM
LOCATION SIMPLE: LEFT FOOT
LOCATION SIMPLE: RIGHT UPPER BACK
LOCATION SIMPLE: UPPER BACK

## 2023-10-18 NOTE — PROCEDURE: PRESCRIPTION MEDICATION MANAGEMENT
Detail Level: Zone
Render In Strict Bullet Format?: Yes
Initiate Treatment: Betamethasone dipropionate 0.05 % lotion: Apply once daily to scalp until resolved for seborrheic dermatitis and itchiness.  Use up to 15 days per month.

## 2024-02-03 ENCOUNTER — HEALTH MAINTENANCE LETTER (OUTPATIENT)
Age: 49
End: 2024-02-03

## 2024-03-06 NOTE — PROCEDURE: MOHS SURGERY
Call to Armando @ [phone]  Responded @ [phone]   Oculoplastic Surgeon Procedure Text (E): After obtaining clear surgical margins the patient was sent to oculoplastics for surgical repair.  The patient understands they will receive post-surgical care and follow-up from the referring physician's office.

## 2024-03-14 ENCOUNTER — OFFICE VISIT (OUTPATIENT)
Dept: CARDIOLOGY | Facility: CLINIC | Age: 49
End: 2024-03-14
Payer: COMMERCIAL

## 2024-03-14 ENCOUNTER — HOSPITAL ENCOUNTER (OUTPATIENT)
Dept: CARDIOLOGY | Facility: CLINIC | Age: 49
Discharge: HOME | End: 2024-03-14
Attending: INTERNAL MEDICINE | Admitting: INTERNAL MEDICINE
Payer: COMMERCIAL

## 2024-03-14 VITALS
WEIGHT: 139 LBS | SYSTOLIC BLOOD PRESSURE: 113 MMHG | DIASTOLIC BLOOD PRESSURE: 76 MMHG | HEART RATE: 62 BPM | BODY MASS INDEX: 23.16 KG/M2 | HEIGHT: 65 IN | OXYGEN SATURATION: 100 %

## 2024-03-14 DIAGNOSIS — Q24.3 CONGENITAL INFUNDIBULAR PULMONIC STENOSIS: ICD-10-CM

## 2024-03-14 DIAGNOSIS — Z95.2 S/P PULMONARY VALVE REPLACEMENT: ICD-10-CM

## 2024-03-14 DIAGNOSIS — Z95.2 HEART VALVE REPLACED: Primary | ICD-10-CM

## 2024-03-14 PROCEDURE — 93306 TTE W/DOPPLER COMPLETE: CPT | Mod: 26 | Performed by: PEDIATRICS

## 2024-03-14 PROCEDURE — 99215 OFFICE O/P EST HI 40 MIN: CPT | Performed by: INTERNAL MEDICINE

## 2024-03-14 PROCEDURE — 93325 DOPPLER ECHO COLOR FLOW MAPG: CPT

## 2024-03-14 NOTE — LETTER
3/14/2024    Pamela Pérez MD  [address] [facility], [place] [address]    RE: Giana Unger       Dear Colleague,     I had the pleasure of seeing Giana Unger in the [facility].  CARDIOLOGY CONSULTATION:    Ms. Unger is a delightful, 48-year-old woman that I met in April of 2020 with a past medical history significant for infundibular pulmonary valve stenosis.   Her initial surgery was at 5 months of age in [place] at [facility] in [place].  She followed with Dr. Yovanny Ramey, and due to progressive right ventricular enlargement and dysfunction, she was referred to [facility] in 2006 and underwent valve replacement with a 25 mm Megan-Smith valve and pericardial patch enlargement on 01/03/2006 with a postoperative course that was not complicated.      Ms. Unger has been doing reasonably well from the standpoint of her valve.  On her echo 3/2024, her valve is stable with moderate pulmonary valve insufficiency and a  peak gradient of 29 mmHg.  RV was normal size on MRI 9/2020. She has not had a CPX.     She is an OB nurse.  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    Her mom was diagnosed with BAV and ascending aortic aneurysm at 4.2 CM.  She has 4 siblings.  No early coronary artery disease in the family.     She did have a pituitary adenoma that was diagnosed at 12 years of age.  She underwent surgery at 12, 15 and 16 years of age, and at 16, she also received radiation with no subsequent surgeries. She is on estrogen replacement, Synthroid and cortisone 10 mg alternating with 15 mg, followed closely by an endocrinologist. She has had appropriate followup for this condition, including bone mineral density.       She is taking a baby aspirin and does take antibiotics before the dentist.  Lipids are reasonably controlled.   She is exercising regularly     PAST MEDICAL HISTORY:  Past Medical History:   Diagnosis Date     Congenital infundibular pulmonic stenosis     Esophageal reflux     Heart valve replaced 1/12/2006     Problem list name updated by automated process. Provider to review    Low back pain     Osteopenia     Panhypopituitarism (H24)     Pituitary adenoma (H) 1986    surgeries x 3    Primary pulmonary hypertension (H)     Pulmonary valve disorders     S/P pulmonary valve replacement with bioprosthetic valve 2006    and pericardial patch    Sciatica        CURRENT MEDICATIONS:  Current Outpatient Medications   Medication Sig Dispense Refill    AMOXICILLIN PO Take 1,000 mg by mouth TAKES BEFORE DENTAL VISIT      aspirin 81 MG EC tablet Take 1 tablet (81 mg) by mouth daily 90 tablet 3    CALCIUM + D 600-200 MG-IU OR TABS 1 TABLET DAILY 0 0    CORTEF 10 MG OR TABS 1 tab po alternating with 1.5 tabs po qday 0 0    drospirenone-ethinyl estradiol (DENVER) 3-0.02 MG tablet Take 1 tablet by mouth daily      LANsoprazole (PREVACID) 30 MG capsule Take 30 mg by mouth daily      levothyroxine (SYNTHROID/LEVOTHROID) 88 MCG tablet daily 0 0    Multiple Vitamins-Minerals (LUTEIN-ZEAXANTHIN PO)       Probiotic Product (PROBIOTIC DAILY PO) Take by mouth daily      Red Yeast Rice Extract (RED YEAST RICE PO)       Turmeric (QC TUMERIC COMPLEX PO)          PAST SURGICAL HISTORY:  Past Surgical History:   Procedure Laterality Date    C ANESTH,UGI ENDOSCOPY  3/04    Dr Bettencourt/ hiatal hernia    ENDOSCOPIC INJECTION/IMPLANT  4/2005    injection into EGD area     RADIATION THERAPY SPECIAL TREATMENT PROCEDURE  1992    5 weeks to the pituitary area     TOOTH EXTRACTION W/FORCEP  age 16    2 teeth surgeries    ZZC APPENDECTOMY  1999    [facility] EXCIS PITUITARY,TRANSNASAL/SEPTAL  1988/90/92    adenoma pituitary    [facility] REPAIR PULMONARY ART STEN  1975    5 months    C REPLACEMENT, PULMONARY VALVE  1/3/2006    [facility] COLONOSCOPY THRU STOMA, DIAGNOSTIC  3/04       ALLERGIES  Ancef [cefazolin], Morphine and related, Nitrofuran derivatives,  "and Sulfa antibiotics    FAMILY HX:  Family History   Problem Relation Age of Onset    Coronary Artery Disease Sister     Allergies No family hx of     Cancer No family hx of     Diabetes No family hx of     Lipids No family hx of        SOCIAL HX:  Social History     Socioeconomic History    Marital status:      Spouse name: None    Number of children: 0    Years of education: 16    Highest education level: None   Occupational History    Occupation: rn     Employer: [employer]     Comment: Labor and delivery     Employer: [employer]   Tobacco Use    Smoking status: Never    Smokeless tobacco: Never   Substance and Sexual Activity    Alcohol use: No    Drug use: No    Sexual activity: Not Currently   Other Topics Concern     Service No    Blood Transfusions No    Caffeine Concern No     Comment: 1 coffee a day, maybe 1 soda a week    Occupational Exposure Yes    Hobby Hazards No    Sleep Concern No    Stress Concern No    Weight Concern No    Special Diet No    Back Care No    Exercise No    Bike Helmet No    Seat Belt Yes    Self-Exams No       ROS:  Constitutional: No fever, chills, or sweats. No weight gain/loss.   ENT: No visual disturbance, ear ache, epistaxis, sore throat.   Allergies/Immunologic: Negative.   Respiratory: No cough, hemoptysis.   Cardiovascular: As per HPI.   GI: No nausea, vomiting, hematemesis, melena, or hematochezia.   : No urinary frequency, dysuria, or hematuria.   Integument: Negative.   Psychiatric: Negative.   Neuro: Negative.   Endocrinology: Negative.   Musculoskeletal: No myalgia.    VITAL SIGNS:  /76   Pulse 62   Ht 1.651 m (5' 5\")   Wt 63 kg (139 lb)   SpO2 100%   BMI 23.13 kg/m    Body mass index is 23.13 kg/m .  Wt Readings from Last 2 Encounters:   03/14/24 63 kg (139 lb)   01/12/23 62.2 kg (137 lb 1.6 oz)       PHYSICAL EXAM  Giana Unger IS A 48 year old female.in no acute distress.  HEENT: Unremarkable.  Neck: JVP normal.  .  " "Lungs: CTA.  Cor: RRR. Normal S1 and S2.  No murmur,   Abd: Soft.  Extremities: No C/C/E.  Neuro: Grossly intact.    LABS    Lab Results   Component Value Date    WBC 11.7 12/16/2021    WBC 15.5 01/12/2006     Lab Results   Component Value Date    RBC 4.85 12/16/2021    RBC 4.52 01/12/2006     Lab Results   Component Value Date    HGB 13.1 12/16/2021    HGB 11.9 01/12/2006     Lab Results   Component Value Date    HCT 41.2 12/16/2021    HCT 37.8 01/12/2006     No components found for: \"MCT\"  Lab Results   Component Value Date    MCV 85 12/16/2021    MCV 83.5 01/12/2006     Lab Results   Component Value Date    MCH 27.0 12/16/2021    MCH 26.4 01/12/2006     Lab Results   Component Value Date    MCHC 31.8 12/16/2021    MCHC 31.5 01/12/2006     Lab Results   Component Value Date    RDW 12.5 12/16/2021     Lab Results   Component Value Date     12/16/2021     01/12/2006      Recent Labs   Lab Test 12/16/21  0839      POTASSIUM 4.2   CHLORIDE 107   CO2 26   ANIONGAP 5   GLC 76   BUN 13   CR 1.14*   MCKENNA 8.9     Recent Labs   Lab Test 12/16/21  0839   CHOL 211*   HDL 71   *   TRIG 102   NHDL 140*        IMPRESSION, REPORT, PLAN:   1.  Infundibular pulmonary valve stenosis, status post initial surgery at 5 months of age at [facility] in [place], [place].  Working on getting this operative report.   2.  Progression to severe pulmonary valve regurgitation with RV enlargement, status post pulmonary valve replacement on 01/03/2006 by Dr. Villegas at [facility] with a 25 mm Megan-Smith valve and pericardial patch enlargement.  Valve stable with moderate PI and peak gradient of 29 mmHg. Normal RV size and function  3. Hypothyroidism on synthroid.  4.  History of a pituitary adenoma, status post surgery at 12, 15 and 16 years of age with radiation at 16 years of age, followed closely by Endocrinology.  On cortisone, estrogen and Synthroid.   5.  Hyperlipidemia not on treatment   6.  Mild A1C " elevation at 5.7 2021       DISCUSSION:  It was a pleasure being involved in the care of Ms. Unger. She is doing well with no concerning cardiac symptoms.  Echo is stable. She has close follow up with her primary.  She is taking baby ASA and abx before the dentist.     We discussed with her mixed valve disease doing a CPX when it is convenient for her.  Will plan A1C, lipids and CMP as well.      Plan follow up in a year with an echo.  She understands and is in agreement with the plan as outlined.  All questions were answered.        LIZET CONRAD MD   42 minutes face-face, documentation and review of records on day of visit    Thank you for allowing me to participate in the care of your patient.      Sincerely,     Lizet Conrad MD     [facility]  cc:   Lizet Conrad MD  [address] [facility] [address]  [place] [address]   Detail Level: Detailed Depth Of Biopsy: dermis Was A Bandage Applied: Yes Size Of Lesion In Cm: 0 Biopsy Type: H and E Biopsy Method: Dermablade Anesthesia Type: 1% lidocaine with epinephrine Anesthesia Volume In Cc: 0.5 Hemostasis: Aluminum Chloride Wound Care: Vaseline Dressing: bandage Destruction After The Procedure: No Type Of Destruction Used: Curettage Curettage Text: The wound bed was treated with curettage after the biopsy was performed. Cryotherapy Text: The wound bed was treated with cryotherapy after the biopsy was performed. Electrodesiccation Text: The wound bed was treated with electrodesiccation after the biopsy was performed. Electrodesiccation And Curettage Text: The wound bed was treated with electrodesiccation and curettage after the biopsy was performed. Silver Nitrate Text: The wound bed was treated with silver nitrate after the biopsy was performed. Lab: [id number] Lab Facility: [id number] Consent: Written consent was obtained and risks were reviewed including but not limited to scarring, infection, bleeding, scabbing, incomplete removal, nerve damage and allergy to anesthesia. Post-Care Instructions: I reviewed with the patient in detail post-care instructions. Patient is to keep the biopsy site dry overnight, and then apply bacitracin twice daily until healed. Patient may apply hydrogen peroxide soaks to remove any crusting. Notification Instructions: Patient will be notified of biopsy results. However, patient instructed to call the office if not contacted within 2 weeks. Results will be faxed to PCP once they are available. Billing Type: Third-Party Bill Information: Selecting Yes will display possible errors in your note based on the variables you have selected. This validation is only offered as a suggestion for you. PLEASE NOTE THAT THE VALIDATION TEXT WILL BE REMOVED WHEN YOU FINALIZE YOUR NOTE. IF YOU WANT TO FAX A PRELIMINARY NOTE YOU WILL NEED TO TOGGLE THIS TO 'NO' IF YOU DO NOT WANT IT IN YOUR FAXED NOTE.

## 2024-03-14 NOTE — NURSING NOTE
Cardiac Testing: Patient given instructions regarding  echocardiogram . Discussed purpose, preparation, procedure and when to expect results reported back to the patient. Patient demonstrated understanding of this information and agreed to call with further questions or concerns.    Labs: Patient was given results of the laboratory testing obtained today. Patient was instructed to return for the next laboratory testing in 1 year . Patient demonstrated understanding of this information and agreed to call with further questions or concerns.     Return Appointment: Patient given instructions regarding scheduling next clinic visit. Patient demonstrated understanding of this information and agreed to call with further questions or concerns.    Patient stated she understood all health information given and agreed to call with further questions or concerns.

## 2024-03-14 NOTE — PATIENT INSTRUCTIONS
You were seen today in [facility]    Cardiology Providers you saw during your visit:  CINDY Conrad MD    Diagnosis:  S/P pulmonary valve replacement    Results:  CINDY Conrad MD reviewed the results of your echo testing today in clinic.    Recommendations for you:    Complete a CPX when you are able    CardioPulmonary Stress Test (CPX)  CPX is a maximal (meaning you exercise to exhaustion, not to achieve a heart rate) exercise test where we measure how well your heart/body uses oxygen or energy. It is the gold standard for measuring functional capacity and helps us differentiate limitations due to lungs, heart, or fitness.   A CPX is NOT a typical stress test. You will NOT be asked to hold your Beta Blocker medication.   You will be scheduled for a CardioPulmonary Stress Test at the [facility] ([address], [place] [address], [phone]).  Follow these instructions:    1. Report to the GOLD waiting room in the [facility] hospital.  2. Nothing to eat for 3 hours prior to your test. You may have clear liquids up to the time of your test.  3. Please wear loose, two-piece clothing and comfortable, rubber-soled shoes for walking.       What happens during this test?   We will place a blood pressure cuff on your arm. We will also attach small pads to your chest. The pads are hooked to an EKG (electrocardiogram) machine that shows how your heart is working.  You will walk on a treadmill or pedal a bicycle.  You will breathe through a mouthpiece, and the air you breathe out will be measured at rest and during exercise.  We will watch your EKG, heart rate and heart rhythm the entire time.  We will check your blood pressure during the test.  This test takes two (2) hours.      For Patients with Diabetes: Your RN Coordinator will give you instructions on adjusting your diabetic medications for the day of your test                            If you have questions please contact your diabetic care team.                          Remember to  bring your glucometer & insulin with you to take after your test if needed.        General Cardiac Recommendations:  Continue to eat a heart healthy, low salt diet.  Continue to get 20-30 minutes of aerobic activity, 4-5 days per week.  Examples of aerobic activity include walking, running, swimming, cycling, etc.  Continue to observe good oral hygiene, with regular dental visits.      SBE prophylaxis:   Yes__X__  No____    If YES is checked, follow the recommendations outlined below:  Take antibiotic(s) prior to recommended dental procedures and procedures on the respiratory tract or with infected skin, muscle or bones. SBE prophylaxis is not needed for routine GI and  procedures (ie. Colonoscopy or vaginal delivery)  Observe good oral hygiene daily, as advised by your dentist. Get regular professional dental care.  Keep cuts clean.  Infections should be treated promptly.  Symptoms of Infective Endocarditis could include: fever lasting more than 4-5 days or a recurrent fever that initially resolves but returns within 1-2 days)      Exercise restrictions:   Yes__X__  No____         If yes, list restrictions:  Must be allowed to rest if fatigued or SOB      FASTING CHOLESTEROL was checked in the last 5 years YES__X_  NO___ (2021)      Follow-up:  Follow up with  March 1 year with echo and fasting labs prior (if not done by your primary care provider)    If you have questions or concerns please contact us at:    Brynn Calderon, RN, BSN   Eli Kumari (Scheduling)  Nurse Care Coordinator     Clinic   Adult Congenital and CV Genetics   Adult Congenital and CV Genetic  [facility]   [facility]  (P) [phone]     (P) [phone]            (F) [phone]        For after hours urgent needs, call [phone] and ask to speak to  the Adult Congenital Physician on call.  Mention Job Code 0401.    For emergencies call 911.    [facility]  Mail Code [id number]  [address] [id number]

## 2024-03-14 NOTE — PROGRESS NOTES
CARDIOLOGY CONSULTATION:    Ms. Unger is a delightful, 48-year-old woman that I met in April of 2020 with a past medical history significant for infundibular pulmonary valve stenosis.   Her initial surgery was at 5 months of age in [place] at [facility] in [place].  She followed with Dr. Yovanny Ramey, and due to progressive right ventricular enlargement and dysfunction, she was referred to [facility] in 2006 and underwent valve replacement with a 25 mm Megan-Smith valve and pericardial patch enlargement on 01/03/2006 with a postoperative course that was not complicated.      Ms. Unger has been doing reasonably well from the standpoint of her valve.  On her echo 3/2024, her valve is stable with moderate pulmonary valve insufficiency and a  peak gradient of 29 mmHg.  RV was normal size on MRI 9/2020. She has not had a CPX.     She is an OB nurse.  She has a 15-year-old son and an 8-year-old daughter and a [place] son who is also 15.    Her mom was diagnosed with BAV and ascending aortic aneurysm at 4.2 CM.  She has 4 siblings.  No early coronary artery disease in the family.     She did have a pituitary adenoma that was diagnosed at 12 years of age.  She underwent surgery at 12, 15 and 16 years of age, and at 16, she also received radiation with no subsequent surgeries. She is on estrogen replacement, Synthroid and cortisone 10 mg alternating with 15 mg, followed closely by an endocrinologist. She has had appropriate followup for this condition, including bone mineral density.       She is taking a baby aspirin and does take antibiotics before the dentist.  Lipids are reasonably controlled.   She is exercising regularly     PAST MEDICAL HISTORY:  Past Medical History:   Diagnosis Date    Congenital infundibular pulmonic stenosis     Esophageal reflux     Heart valve replaced 1/12/2006     Problem list name updated by automated process. Provider to review    Low back pain     Osteopenia      Panhypopituitarism (H24)     Pituitary adenoma (H) 1986    surgeries x 3    Primary pulmonary hypertension (H)     Pulmonary valve disorders     S/P pulmonary valve replacement with bioprosthetic valve 2006    and pericardial patch    Sciatica        CURRENT MEDICATIONS:  Current Outpatient Medications   Medication Sig Dispense Refill    AMOXICILLIN PO Take 1,000 mg by mouth TAKES BEFORE DENTAL VISIT      aspirin 81 MG EC tablet Take 1 tablet (81 mg) by mouth daily 90 tablet 3    CALCIUM + D 600-200 MG-IU OR TABS 1 TABLET DAILY 0 0    CORTEF 10 MG OR TABS 1 tab po alternating with 1.5 tabs po qday 0 0    drospirenone-ethinyl estradiol (DENVER) 3-0.02 MG tablet Take 1 tablet by mouth daily      LANsoprazole (PREVACID) 30 MG capsule Take 30 mg by mouth daily      levothyroxine (SYNTHROID/LEVOTHROID) 88 MCG tablet daily 0 0    Multiple Vitamins-Minerals (LUTEIN-ZEAXANTHIN PO)       Probiotic Product (PROBIOTIC DAILY PO) Take by mouth daily      Red Yeast Rice Extract (RED YEAST RICE PO)       Turmeric (QC TUMERIC COMPLEX PO)          PAST SURGICAL HISTORY:  Past Surgical History:   Procedure Laterality Date    C ANESTH,UGI ENDOSCOPY  3/04    Dr Bettencourt/ hiatal hernia    ENDOSCOPIC INJECTION/IMPLANT  4/2005    injection into EGD area    HC RADIATION THERAPY SPECIAL TREATMENT PROCEDURE  1992    5 weeks to the pituitary area    HC TOOTH EXTRACTION W/FORCEP  age 16    2 teeth surgeries    ZZC APPENDECTOMY  1999    [facility] EXCIS PITUITARY,TRANSNASAL/SEPTAL  1988/90/92    adenoma pituitary    ZZC REPAIR PULMONARY ART STEN  1975    5 months    ZZC REPLACEMENT, PULMONARY VALVE  1/3/2006    [facility] COLONOSCOPY THRU STOMA, DIAGNOSTIC  3/04       ALLERGIES  Ancef [cefazolin], Morphine and related, Nitrofuran derivatives, and Sulfa antibiotics    FAMILY HX:  Family History   Problem Relation Age of Onset    Coronary Artery Disease Sister     Allergies No family hx of     Cancer No family hx of     Diabetes No family hx of   "   Lipids No family hx of        SOCIAL HX:  Social History     Socioeconomic History    Marital status:      Spouse name: None    Number of children: 0    Years of education: 16    Highest education level: None   Occupational History    Occupation: rn     Employer: [employer]     Comment: Labor and delivery     Employer: [employer]   Tobacco Use    Smoking status: Never    Smokeless tobacco: Never   Substance and Sexual Activity    Alcohol use: No    Drug use: No    Sexual activity: Not Currently   Other Topics Concern     Service No    Blood Transfusions No    Caffeine Concern No     Comment: 1 coffee a day, maybe 1 soda a week    Occupational Exposure Yes    Hobby Hazards No    Sleep Concern No    Stress Concern No    Weight Concern No    Special Diet No    Back Care No    Exercise No    Bike Helmet No    Seat Belt Yes    Self-Exams No       ROS:  Constitutional: No fever, chills, or sweats. No weight gain/loss.   ENT: No visual disturbance, ear ache, epistaxis, sore throat.   Allergies/Immunologic: Negative.   Respiratory: No cough, hemoptysis.   Cardiovascular: As per HPI.   GI: No nausea, vomiting, hematemesis, melena, or hematochezia.   : No urinary frequency, dysuria, or hematuria.   Integument: Negative.   Psychiatric: Negative.   Neuro: Negative.   Endocrinology: Negative.   Musculoskeletal: No myalgia.    VITAL SIGNS:  /76   Pulse 62   Ht 1.651 m (5' 5\")   Wt 63 kg (139 lb)   SpO2 100%   BMI 23.13 kg/m    Body mass index is 23.13 kg/m .  Wt Readings from Last 2 Encounters:   03/14/24 63 kg (139 lb)   01/12/23 62.2 kg (137 lb 1.6 oz)       PHYSICAL EXAM  Giana Unger IS A 48 year old female.in no acute distress.  HEENT: Unremarkable.  Neck: JVP normal.  .  Lungs: CTA.  Cor: RRR. Normal S1 and S2.  No murmur,   Abd: Soft.  Extremities: No C/C/E.  Neuro: Grossly intact.    LABS    Lab Results   Component Value Date    WBC 11.7 12/16/2021    WBC 15.5 01/12/2006 " "    Lab Results   Component Value Date    RBC 4.85 12/16/2021    RBC 4.52 01/12/2006     Lab Results   Component Value Date    HGB 13.1 12/16/2021    HGB 11.9 01/12/2006     Lab Results   Component Value Date    HCT 41.2 12/16/2021    HCT 37.8 01/12/2006     No components found for: \"MCT\"  Lab Results   Component Value Date    MCV 85 12/16/2021    MCV 83.5 01/12/2006     Lab Results   Component Value Date    MCH 27.0 12/16/2021    MCH 26.4 01/12/2006     Lab Results   Component Value Date    MCHC 31.8 12/16/2021    MCHC 31.5 01/12/2006     Lab Results   Component Value Date    RDW 12.5 12/16/2021     Lab Results   Component Value Date     12/16/2021     01/12/2006      Recent Labs   Lab Test 12/16/21  0839      POTASSIUM 4.2   CHLORIDE 107   CO2 26   ANIONGAP 5   GLC 76   BUN 13   CR 1.14*   MCKENNA 8.9     Recent Labs   Lab Test 12/16/21  0839   CHOL 211*   HDL 71   *   TRIG 102   NHDL 140*        IMPRESSION, REPORT, PLAN:   1.  Infundibular pulmonary valve stenosis, status post initial surgery at 5 months of age at [facility] in [place], [place].  Working on getting this operative report.   2.  Progression to severe pulmonary valve regurgitation with RV enlargement, status post pulmonary valve replacement on 01/03/2006 by Dr. Villegas at [facility] with a 25 mm Megan-Smith valve and pericardial patch enlargement.  Valve stable with moderate PI and peak gradient of 29 mmHg. Normal RV size and function  3. Hypothyroidism on synthroid.  4.  History of a pituitary adenoma, status post surgery at 12, 15 and 16 years of age with radiation at 16 years of age, followed closely by Endocrinology.  On cortisone, estrogen and Synthroid.   5.  Hyperlipidemia not on treatment   6.  Mild A1C elevation at 5.7 2021       DISCUSSION:  It was a pleasure being involved in the care of Ms. Unger. She is doing well with no concerning cardiac symptoms.  Echo is stable. She has close follow up with " her primary.  She is taking baby ASA and abx before the dentist.     We discussed with her mixed valve disease doing a CPX when it is convenient for her.  Will plan A1C, lipids and CMP as well.      Plan follow up in a year with an echo.  She understands and is in agreement with the plan as outlined.  All questions were answered.        LIZET ROSSI MD   42 minutes face-face, documentation and review of records on day of visit

## 2024-04-13 ENCOUNTER — HEALTH MAINTENANCE LETTER (OUTPATIENT)
Age: 49
End: 2024-04-13

## 2024-04-15 DIAGNOSIS — I37.0 NONRHEUMATIC PULMONARY VALVE STENOSIS: ICD-10-CM

## 2024-04-15 DIAGNOSIS — Z95.2 S/P PULMONARY VALVE REPLACEMENT: ICD-10-CM

## 2024-04-17 RX ORDER — ASPIRIN 81 MG/1
81 TABLET ORAL DAILY
Qty: 90 TABLET | Refills: 3 | Status: SHIPPED | OUTPATIENT
Start: 2024-04-17

## 2024-05-16 ENCOUNTER — RX ONLY (RX ONLY)
Age: 49
End: 2024-05-16

## 2024-05-16 RX ORDER — TRETIONIN 0.25 MG/G
CREAM TOPICAL QHS
Qty: 45 | Refills: 2 | Status: ERX | COMMUNITY
Start: 2024-05-16

## 2025-04-10 ENCOUNTER — TELEPHONE (OUTPATIENT)
Dept: CARDIOLOGY | Facility: CLINIC | Age: 50
End: 2025-04-10
Payer: COMMERCIAL

## 2025-04-10 DIAGNOSIS — I37.0 NONRHEUMATIC PULMONARY VALVE STENOSIS: ICD-10-CM

## 2025-04-10 DIAGNOSIS — Z95.2 S/P PULMONARY VALVE REPLACEMENT: ICD-10-CM

## 2025-04-10 RX ORDER — ASPIRIN 81 MG/1
81 TABLET ORAL DAILY
Qty: 90 TABLET | Refills: 3 | Status: SHIPPED | OUTPATIENT
Start: 2025-04-10

## 2025-04-10 NOTE — TELEPHONE ENCOUNTER
Pt left a message yesterday 4/9 at 3:46pm stating that she needs refills on her ASA. Pt sated that she uses [facility] for the her medication and would like the refills sent there.

## 2025-04-19 ENCOUNTER — HEALTH MAINTENANCE LETTER (OUTPATIENT)
Age: 50
End: 2025-04-19

## 2025-05-06 ENCOUNTER — LAB (OUTPATIENT)
Dept: LAB | Facility: CLINIC | Age: 50
End: 2025-05-06
Payer: COMMERCIAL

## 2025-05-06 DIAGNOSIS — Z95.2 S/P PULMONARY VALVE REPLACEMENT: ICD-10-CM

## 2025-05-06 DIAGNOSIS — Z95.2 HEART VALVE REPLACED: ICD-10-CM

## 2025-05-06 DIAGNOSIS — Q24.3 CONGENITAL INFUNDIBULAR PULMONIC STENOSIS: ICD-10-CM

## 2025-05-06 LAB
ALBUMIN SERPL BCG-MCNC: 4.2 G/DL (ref 3.5–5.2)
ALP SERPL-CCNC: 45 U/L (ref 40–150)
ALT SERPL W P-5'-P-CCNC: 19 U/L (ref 0–50)
ANION GAP SERPL CALCULATED.3IONS-SCNC: 13 MMOL/L (ref 7–15)
AST SERPL W P-5'-P-CCNC: 22 U/L (ref 0–45)
BILIRUB SERPL-MCNC: 0.4 MG/DL
BUN SERPL-MCNC: 24.1 MG/DL (ref 6–20)
CALCIUM SERPL-MCNC: 8.4 MG/DL (ref 8.8–10.4)
CHLORIDE SERPL-SCNC: 105 MMOL/L (ref 98–107)
CHOLEST SERPL-MCNC: 181 MG/DL
CREAT SERPL-MCNC: 1.39 MG/DL (ref 0.51–0.95)
EGFRCR SERPLBLD CKD-EPI 2021: 46 ML/MIN/1.73M2
EST. AVERAGE GLUCOSE BLD GHB EST-MCNC: 128 MG/DL
FASTING STATUS PATIENT QL REPORTED: YES
GLUCOSE SERPL-MCNC: 83 MG/DL (ref 70–99)
HBA1C MFR BLD: 6.1 %
HCO3 SERPL-SCNC: 22 MMOL/L (ref 22–29)
HDLC SERPL-MCNC: 70 MG/DL
LDLC SERPL CALC-MCNC: 91 MG/DL
NONHDLC SERPL-MCNC: 111 MG/DL
POTASSIUM SERPL-SCNC: 4 MMOL/L (ref 3.4–5.3)
PROT SERPL-MCNC: 6.5 G/DL (ref 6.4–8.3)
SODIUM SERPL-SCNC: 140 MMOL/L (ref 135–145)
TRIGL SERPL-MCNC: 101 MG/DL

## 2025-05-06 PROCEDURE — 83036 HEMOGLOBIN GLYCOSYLATED A1C: CPT

## 2025-05-06 PROCEDURE — 36415 COLL VENOUS BLD VENIPUNCTURE: CPT

## 2025-05-06 PROCEDURE — 82565 ASSAY OF CREATININE: CPT

## 2025-05-06 PROCEDURE — 82465 ASSAY BLD/SERUM CHOLESTEROL: CPT

## 2025-05-06 NOTE — PATIENT INSTRUCTIONS
Thank you for visiting the Adult Congenital and Cardiovascular Genetics Clinic at the [facility].    Cardiology Providers you saw during your visit:  CINDY Conrad MD    Diagnosis:  pulmonary stenosis    Results:  CINDY Conrad MD reviewed the results of your echo and lab testing today in clinic.    If you have questions or concerns, please call us at [phone] or contact us through [employer].  ______________________________________________________________________________    Recommendations from your Cardiology Provider TODAY:    Complete a cardiopulmonary stress test (CPX)- please call us when you want to schedule [phone]      CardioPulmonary Stress Test (CPX)  CPX is a maximal (meaning you exercise to exhaustion, not to achieve a heart rate) exercise test where we measure how well your heart/body uses oxygen or energy. It is the gold standard for measuring functional capacity and helps us differentiate limitations due to lungs, heart, or fitness.   A CPX is NOT a typical stress test. You will NOT be asked to hold your Beta Blocker medication.   You will be scheduled for a CardioPulmonary Stress Test at the [facility] ([address], [facility] [address], [phone]).  Follow these instructions:    1. Report to the GOLD waiting room in the main hospital.  2. Nothing to eat for 3 hours prior to your test. You may have clear liquids up to the time of your test.  3. Please wear loose, two-piece clothing and comfortable, rubber-soled shoes for walking.       What happens during this test?   We will place a blood pressure cuff on your arm. We will also attach small pads to your chest. The pads are hooked to an EKG (electrocardiogram) machine that shows how your heart is working.  You will walk on a treadmill or pedal a bicycle.  You will breathe through a mouthpiece, and the air you breathe out will be measured at rest and during exercise.  We will watch your EKG, heart  "rate and heart rhythm the entire time.  We will check your blood pressure during the test.  This test takes two (2) hours.      For Patients with Diabetes: Your RN Coordinator will give you instructions on adjusting your diabetic medications for the day of your test                           If you have questions please contact your diabetic care team.                          Remember to  bring your glucometer & insulin with you to take after your test if needed.        ____________________________________________________________________________________________________    Follow-up Plan:  Follow up with  March in 1 year with a cardiac MRI prior    ____________________________________________________________________________________________________    If you have questions or concerns, please call us at [phone] or contact us through MyChart. Our fax number is [phone]    Brynn Calderon RN RN, BSN   [facility] (Scheduling)  Nurse Care Coordinator     Clinic   Adult Congenital and CV Genetics              Adult Congenital and CV Genetics  [facility]              [facility]        For after hours urgent needs, call [phone] and ask to speak to the \"On-Call Cardiologist.\"    For emergencies call [phone].      ____________________________________________________________________________________________________    Additional Important Information for Your Heart Health      General Cardiac Recommendations:  Continue to eat a heart healthy, low salt diet.  Continue to get 20-30 minutes of aerobic activity, 4-5 days per week.  Examples of aerobic activity include walking, running, swimming, cycling, etc.  Continue to observe good oral hygiene, with regular dental visits.        SBE prophylaxis (antibiotics needed before dental appointments):   Yes__x__  No____    SBE prophylaxis applies to patients with certain heart conditions who are " recommended to take antibiotics before dental appointments and other specific procedures. These antibiotics are to help prevent an infection of the heart (endocarditis) that certain patients are at higher risk of developing. The guidelines used come from the American Heart Association and are periodically updated.    If YES is checked, follow the recommendations outlined below:  Take antibiotic(s) prior to recommended dental procedures and procedures involving the respiratory tract or procedures involving infections of the skin, muscle or bones.   SBE prophylaxis is not needed for routine gastrointestinal and genitourinary procedures (ie. Colonoscopy or vaginal delivery)  Observe good oral hygiene daily, as advised by your dentist. Get regular professional dental care.  Keep cuts and other open injuries clean.  All infections should be treated as soon as possible.  Symptoms of Infective Endocarditis could include: fever lasting more than 4-5 days or a recurrent fever that initially resolves but returns within 1-2 days). Call us a [phone] if you are experiencing these symptoms.        FASTING CHOLESTEROL was checked in the last 5 years YES__x__  NO____ (2025)  If no, please follow up with your primary care physician. You should have a cholesterol screening every 5 years at minimum, and every year if taking a medication for your cholesterol levels.

## 2025-05-08 ENCOUNTER — HOSPITAL ENCOUNTER (OUTPATIENT)
Dept: CARDIOLOGY | Facility: CLINIC | Age: 50
Discharge: HOME | End: 2025-05-08
Attending: INTERNAL MEDICINE
Payer: COMMERCIAL

## 2025-05-08 ENCOUNTER — OFFICE VISIT (OUTPATIENT)
Dept: CARDIOLOGY | Facility: CLINIC | Age: 50
End: 2025-05-08
Attending: INTERNAL MEDICINE
Payer: COMMERCIAL

## 2025-05-08 VITALS
DIASTOLIC BLOOD PRESSURE: 78 MMHG | BODY MASS INDEX: 23.32 KG/M2 | SYSTOLIC BLOOD PRESSURE: 134 MMHG | OXYGEN SATURATION: 100 % | HEART RATE: 59 BPM | WEIGHT: 140 LBS | HEIGHT: 65 IN

## 2025-05-08 DIAGNOSIS — Q24.9 ADULT CONGENITAL HEART DISEASE: ICD-10-CM

## 2025-05-08 DIAGNOSIS — Z95.2 HEART VALVE REPLACED: ICD-10-CM

## 2025-05-08 DIAGNOSIS — Z95.2 S/P PULMONARY VALVE REPLACEMENT: ICD-10-CM

## 2025-05-08 DIAGNOSIS — I37.0 NONRHEUMATIC PULMONARY VALVE STENOSIS: Primary | ICD-10-CM

## 2025-05-08 DIAGNOSIS — Q24.3 CONGENITAL INFUNDIBULAR PULMONIC STENOSIS: ICD-10-CM

## 2025-05-08 PROCEDURE — 93325 DOPPLER ECHO COLOR FLOW MAPG: CPT

## 2025-05-08 PROCEDURE — 99214 OFFICE O/P EST MOD 30 MIN: CPT | Mod: 25 | Performed by: INTERNAL MEDICINE

## 2025-05-08 PROCEDURE — 3075F SYST BP GE 130 - 139MM HG: CPT | Performed by: INTERNAL MEDICINE

## 2025-05-08 PROCEDURE — 3078F DIAST BP <80 MM HG: CPT | Performed by: INTERNAL MEDICINE

## 2025-05-08 NOTE — LETTER
2025    Pamela Pérez MD  [address] [facility] [id number]    RE: Giana Unger       Dear Colleague,     I had the pleasure of seeing Giana Unger in the [facility].  CARDIOLOGY CONSULTATION:    Ms. Unger is a delightful, 49-year-old woman that I met in 2020 with a past medical history significant for infundibular pulmonary valve stenosis.   Her initial surgery was at 5 months of age in [place] at [facility] in [place].  She followed with Dr. Yovanny Ramey, and due to progressive right ventricular enlargement and dysfunction, she was referred to [facility] in  and underwent valve replacement with a 25 mm Megan-Smith valve and pericardial patch enlargement on 2006 with a postoperative course that was not complicated.      Ms. Unger has been doing reasonably well from the standpoint of her valve.  On her echo 3/2024 and 25 showed her valve is stable with moderate pulmonary valve insufficiency and a  peak gradient of 29-30 mmHg.  RV was normal size on MRI 2020. She has not had a CPX.     She is an OB nurse.  She has a 16-year-old son and an 10-year-old daughter and a [place] son who is also 16.    Her mom was diagnosed with BAV and ascending aortic aneurysm at 4.2 CM.  She has 4 siblings.  No early coronary artery disease in the family.  Her mom remarried last year after her dad . Will spend some time with them at a cabin this summer.     She did have a pituitary adenoma that was diagnosed at 12 years of age.  She underwent surgery at 12, 15 and 16 years of age, and at 16, she also received radiation with no subsequent surgeries. She is on estrogen replacement, Synthroid and cortisone 10 mg alternating with 15 mg, followed closely by an endocrinologist. She has had appropriate followup for this condition, including bone mineral density.       She is taking a baby aspirin and does take antibiotics before the dentist.  Lipids  are reasonably controlled.   She is exercising regularly.  Cholesterol improved on red yeast. A1C 6.1.    PAST MEDICAL HISTORY:  Past Medical History:   Diagnosis Date     Congenital infundibular pulmonic stenosis      Esophageal reflux      Heart valve replaced 1/12/2006     Problem list name updated by automated process. Provider to review     Low back pain      Osteopenia      Panhypopituitarism      Pituitary adenoma (H) 1986    surgeries x 3     Primary pulmonary hypertension (H)      Pulmonary valve disorders      S/P pulmonary valve replacement with bioprosthetic valve 2006    and pericardial patch     Sciatica        CURRENT MEDICATIONS:  Current Outpatient Medications   Medication Sig Dispense Refill     AMOXICILLIN PO Take 1,000 mg by mouth TAKES BEFORE DENTAL VISIT       aspirin 81 MG EC tablet Take 1 tablet (81 mg) by mouth daily. 90 tablet 3     CALCIUM + D 600-200 MG-IU OR TABS 1 TABLET DAILY 0 0     CORTEF 10 MG OR TABS 1 tab po alternating with 1.5 tabs po qday 0 0     drospirenone-ethinyl estradiol (DENVER) 3-0.02 MG tablet Take 1 tablet by mouth daily       LANsoprazole (PREVACID) 30 MG capsule Take 30 mg by mouth daily       levothyroxine (SYNTHROID/LEVOTHROID) 88 MCG tablet daily 0 0     Multiple Vitamins-Minerals (LUTEIN-ZEAXANTHIN PO)        Probiotic Product (PROBIOTIC DAILY PO) Take by mouth daily       Red Yeast Rice Extract (RED YEAST RICE PO)        Turmeric (QC TUMERIC COMPLEX PO)          PAST SURGICAL HISTORY:  Past Surgical History:   Procedure Laterality Date     C ANESTH,UGI ENDOSCOPY  3/04    Dr Bettencourt/ hiatal hernia     ENDOSCOPIC INJECTION/IMPLANT  4/2005    injection into EGD area     HC RADIATION THERAPY SPECIAL TREATMENT PROCEDURE  1992    5 weeks to the pituitary area     HC TOOTH EXTRACTION W/FORCEP  age 16    2 teeth surgeries     [facility]    adenoma pituitary     ZZC REPAIR PULMONARY ART STEN  1975     5 months     ZZC REPLACEMENT, PULMONARY VALVE  1/3/2006    [facility] COLONOSCOPY THRU STOMA, DIAGNOSTIC  3/04       ALLERGIES  Ancef [cefazolin], Morphine and codeine, Nitrofuran derivatives, and Sulfa antibiotics    FAMILY HX:  Family History   Problem Relation Age of Onset     Coronary Artery Disease Sister      Allergies No family hx of      Cancer No family hx of      Diabetes No family hx of      Lipids No family hx of        SOCIAL HX:  Social History     Socioeconomic History     Marital status:      Number of children: 0     Years of education: 16   Occupational History     Occupation: rn     Employer: [employer]     Comment: Labor and delivery     Employer: [employer]   Tobacco Use     Smoking status: Never     Smokeless tobacco: Never   Substance and Sexual Activity     Alcohol use: No     Drug use: No     Sexual activity: Not Currently   Other Topics Concern      Service No     Blood Transfusions No     Caffeine Concern No     Comment: 1 coffee a day, maybe 1 soda a week     Occupational Exposure Yes     Hobby Hazards No     Sleep Concern No     Stress Concern No     Weight Concern No     Special Diet No     Back Care No     Exercise No     Bike Helmet No     Seat Belt Yes     Self-Exams No     Social Drivers of Health     Financial Resource Strain: Low Risk  (12/6/2023)    [facility]    Financial Resource Strain      Difficulty of Paying Living Expenses: 3   Food Insecurity: No Food Insecurity (12/6/2023)    Received from [facility]    Food Insecurity      Do you worry your food will run out before you are able to buy more?: 1   Transportation Needs: No Transportation Needs (12/6/2023)    Received from [facility]    Transportation Needs      Does lack of transportation keep you from medical appointments?: 1      Does lack of transportation keep you from  "work, meetings or getting things that you need?: 1   Social Connections: Socially Integrated (12/6/2023)    Received from [facility] [employer] [facility]    Social Connections      Do you often feel lonely or isolated from those around you?: 0   Housing Stability: Low Risk  (12/6/2023)    Received from [facility] [employer] [facility] [employer] [facility]    Housing Stability      What is your housing situation today?: 1       ROS:  Constitutional: No fever, chills, or sweats. No weight gain/loss.   ENT: No visual disturbance, ear ache, epistaxis, sore throat.   Allergies/Immunologic: Negative.   Respiratory: No cough, hemoptysis.   Cardiovascular: As per HPI.   GI: No nausea, vomiting, hematemesis, melena, or hematochezia.   : No urinary frequency, dysuria, or hematuria.   Integument: Negative.   Psychiatric: Negative.   Neuro: Negative.   Endocrinology: Negative.   Musculoskeletal: No myalgia.    VITAL SIGNS:  /78 (BP Location: Left arm, Patient Position: Sitting)   Pulse 59   Ht 1.651 m (5' 5\")   Wt 63.5 kg (140 lb)   SpO2 100%   BMI 23.30 kg/m    Body mass index is 23.3 kg/m .  Wt Readings from Last 2 Encounters:   05/08/25 63.5 kg (140 lb)   03/14/24 63 kg (139 lb)       PHYSICAL EXAM  Giana Unger IS A 49 year old female.in no acute distress.  HEENT: Unremarkable.  Neck: JVP normal.   Lungs: CTA.  Cor: RRR. Normal S1 and S2.  Diastolic murmur 2/4, systolic mid-peaking.  Abd: Soft, nontender  Extremities: No C/C/E. Neuro: Grossly intact.    LABS    Lab Results   Component Value Date    WBC 11.7 12/16/2021    WBC 15.5 01/12/2006     Lab Results   Component Value Date    RBC 4.85 12/16/2021    RBC 4.52 01/12/2006     Lab Results   Component Value Date    HGB 13.1 12/16/2021    HGB 11.9 01/12/2006     Lab Results   Component Value Date    HCT 41.2 12/16/2021    HCT 37.8 01/12/2006     No components found for: \"MCT\"  Lab Results   Component Value Date    MCV 85 12/16/2021    MCV 83.5 01/12/2006 "     Lab Results   Component Value Date    MCH 27.0 12/16/2021    MCH 26.4 01/12/2006     Lab Results   Component Value Date    MCHC 31.8 12/16/2021    MCHC 31.5 01/12/2006     Lab Results   Component Value Date    RDW 12.5 12/16/2021     Lab Results   Component Value Date     12/16/2021     01/12/2006      Recent Labs   Lab Test 05/06/25  0800 12/16/21  0839    138   POTASSIUM 4.0 4.2   CHLORIDE 105 107   CO2 22 26   ANIONGAP 13 5   GLC 83 76   BUN 24.1* 13   CR 1.39* 1.14*   MCKENNA 8.4* 8.9     Recent Labs   Lab Test 05/06/25  0800 12/16/21  0839   CHOL 181 211*   HDL 70 71   LDL 91 120*   TRIG 101 102   NHDL 111 140*        IMPRESSION, REPORT, PLAN:   1.  Infundibular pulmonary valve stenosis, status post initial surgery at 5 months of age at [facility] in [place], [place].  Working on getting this operative report.   2.  Progression to severe pulmonary valve regurgitation with RV enlargement, status post pulmonary valve replacement on 01/03/2006 by Dr. Villegas at [facility] with a 25 mm Megan-Smith valve and pericardial patch enlargement.  Valve stable with moderate PI and peak gradient of 29 mmHg. Normal RV size and function  3. Hypothyroidism on synthroid.  4.  History of a pituitary adenoma, status post surgery at 12, 15 and 16 years of age with radiation at 16 years of age, followed closely by Endocrinology.  On cortisone, estrogen and Synthroid.   5.  Hyperlipidemia on Red Yeast  6.  Mild A1C elevation at 6.1  7.  Renal insufficiency      DISCUSSION:  It was a pleasure being involved in the care of Ms. Unger. She is doing well with no concerning cardiac symptoms.  Echo is stable. She has close follow up with her primary and endocrinologist.  She is taking baby ASA and abx before the dentist.  Cholesterol improved on Red Yeast.  She will discuss the elevated creatinine with her primary.      We discussed with her mixed valve disease doing a CPX when it is convenient for her  and plan cardiac MRI in a year..       She understands and is in agreement with the plan as outlined.  All questions were answered.        LIZET CONRAD MD   36 minutes face-face, documentation and review of records on day of visit    The longitudinal plan of care for the diagnosis(es)/condition(s) as documented were addressed during this visit. Due to the added complexity in care, I will continue to support Giana in the subsequent management and with ongoing continuity of care.     Thank you for allowing me to participate in the care of your patient.      Sincerely,     Lizet Conrad MD     [facility]  cc:   Lizet Conrad MD  [address] [facility] [address] [place] [id number]

## 2025-05-08 NOTE — PROGRESS NOTES
CARDIOLOGY CONSULTATION:    Ms. Unger is a delightful, 49-year-old woman that I met in 2020 with a past medical history significant for infundibular pulmonary valve stenosis.   Her initial surgery was at 5 months of age in [place] at [facility] in [place].  She followed with Dr. Yovanny Ramey, and due to progressive right ventricular enlargement and dysfunction, she was referred to [facility] in  and underwent valve replacement with a 25 mm Megan-Smith valve and pericardial patch enlargement on 2006 with a postoperative course that was not complicated.      Ms. Unger has been doing reasonably well from the standpoint of her valve.  On her echo 3/2024 and 25 showed her valve is stable with moderate pulmonary valve insufficiency and a  peak gradient of 29-30 mmHg.  RV was normal size on MRI 2020. She has not had a CPX.     She is an OB nurse.  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    Her mom was diagnosed with BAV and ascending aortic aneurysm at 4.2 CM.  She has 4 siblings.  No early coronary artery disease in the family.  Her mom remarried last year after her dad . Will spend some time with them at a cabin this summer.     She did have a pituitary adenoma that was diagnosed at 12 years of age.  She underwent surgery at 12, 15 and 16 years of age, and at 16, she also received radiation with no subsequent surgeries. She is on estrogen replacement, Synthroid and cortisone 10 mg alternating with 15 mg, followed closely by an endocrinologist. She has had appropriate followup for this condition, including bone mineral density.       She is taking a baby aspirin and does take antibiotics before the dentist.  Lipids are reasonably controlled.   She is exercising regularly.  Cholesterol improved on red yeast. A1C 6.1.    PAST MEDICAL HISTORY:  Past Medical History:   Diagnosis Date    Congenital infundibular pulmonic stenosis      Esophageal reflux     Heart valve replaced 1/12/2006     Problem list name updated by automated process. Provider to review    Low back pain     Osteopenia     Panhypopituitarism     Pituitary adenoma (H) 1986    surgeries x 3    Primary pulmonary hypertension (H)     Pulmonary valve disorders     S/P pulmonary valve replacement with bioprosthetic valve 2006    and pericardial patch    Sciatica        CURRENT MEDICATIONS:  Current Outpatient Medications   Medication Sig Dispense Refill    AMOXICILLIN PO Take 1,000 mg by mouth TAKES BEFORE DENTAL VISIT      aspirin 81 MG EC tablet Take 1 tablet (81 mg) by mouth daily. 90 tablet 3    CALCIUM + D 600-200 MG-IU OR TABS 1 TABLET DAILY 0 0    CORTEF 10 MG OR TABS 1 tab po alternating with 1.5 tabs po qday 0 0    drospirenone-ethinyl estradiol (DENVER) 3-0.02 MG tablet Take 1 tablet by mouth daily      LANsoprazole (PREVACID) 30 MG capsule Take 30 mg by mouth daily      levothyroxine (SYNTHROID/LEVOTHROID) 88 MCG tablet daily 0 0    Multiple Vitamins-Minerals (LUTEIN-ZEAXANTHIN PO)       Probiotic Product (PROBIOTIC DAILY PO) Take by mouth daily      Red Yeast Rice Extract (RED YEAST RICE PO)       Turmeric (QC TUMERIC COMPLEX PO)          PAST SURGICAL HISTORY:  Past Surgical History:   Procedure Laterality Date    C ANESTH,UGI ENDOSCOPY  3/04    Dr Bettencourt/ hiatal hernia    ENDOSCOPIC INJECTION/IMPLANT  4/2005    injection into EGD area     RADIATION THERAPY SPECIAL TREATMENT PROCEDURE  1992    5 weeks to the pituitary area    HC TOOTH EXTRACTION W/FORCEP  age 16    2 teeth surgeries    ZZ[facility]  [facility]PTAL  [facility]/90/92    adenoma pituitary    ZZC REPAIR PULMONARY ART STEN  1975    5 months    ZZC REPLACEMENT, PULMONARY VALVE  1/3/2006    [facility] COLONOSCOPY THRU STOMA, DIAGNOSTIC  3/04       ALLERGIES  Ancef [cefazolin], Morphine and codeine, Nitrofuran derivatives, and Sulfa antibiotics    FAMILY HX:  Family  History   Problem Relation Age of Onset    Coronary Artery Disease Sister     Allergies No family hx of     Cancer No family hx of     Diabetes No family hx of     Lipids No family hx of        SOCIAL HX:  Social History     Socioeconomic History    Marital status:     Number of children: 0    Years of education: 16   Occupational History    Occupation: rn     Employer: [employer]     Comment: Labor and delivery     Employer: [employer]   Tobacco Use    Smoking status: Never    Smokeless tobacco: Never   Substance and Sexual Activity    Alcohol use: No    Drug use: No    Sexual activity: Not Currently   Other Topics Concern     Service No    Blood Transfusions No    Caffeine Concern No     Comment: 1 coffee a day, maybe 1 soda a week    Occupational Exposure Yes    Hobby Hazards No    Sleep Concern No    Stress Concern No    Weight Concern No    Special Diet No    Back Care No    Exercise No    Bike Helmet No    Seat Belt Yes    Self-Exams No     Social Drivers of Health     Financial Resource Strain: Low Risk  (12/6/2023)    [facility] from [facility]    Financial Resource Strain     Difficulty of Paying Living Expenses: 3   Food Insecurity: No Food Insecurity (12/6/2023)    Received from [facility]    Food Insecurity     Do you worry your food will run out before you are able to buy more?: 1   Transportation Needs: No Transportation Needs (12/6/2023)    Received from [facility]    Transportation Needs     Does lack of transportation keep you from medical appointments?: 1     Does lack of transportation keep you from work, meetings or getting things that you need?: 1   Social Connections: Socially Integrated (12/6/2023)    [facility] from [facility]    Social Connections     Do you often feel lonely or isolated from those around you?: 0   Housing Stability:  "Low Risk  (12/6/2023)    Received from [facility]    Housing Stability     What is your housing situation today?: 1       ROS:  Constitutional: No fever, chills, or sweats. No weight gain/loss.   ENT: No visual disturbance, ear ache, epistaxis, sore throat.   Allergies/Immunologic: Negative.   Respiratory: No cough, hemoptysis.   Cardiovascular: As per HPI.   GI: No nausea, vomiting, hematemesis, melena, or hematochezia.   : No urinary frequency, dysuria, or hematuria.   Integument: Negative.   Psychiatric: Negative.   Neuro: Negative.   Endocrinology: Negative.   Musculoskeletal: No myalgia.    VITAL SIGNS:  /78 (BP Location: Left arm, Patient Position: Sitting)   Pulse 59   Ht 1.651 m (5' 5\")   Wt 63.5 kg (140 lb)   SpO2 100%   BMI 23.30 kg/m    Body mass index is 23.3 kg/m .  Wt Readings from Last 2 Encounters:   05/08/25 63.5 kg (140 lb)   03/14/24 63 kg (139 lb)       PHYSICAL EXAM  Giana Unger IS A 49 year old female.in no acute distress.  HEENT: Unremarkable.  Neck: JVP normal.   Lungs: CTA.  Cor: RRR. Normal S1 and S2.  Diastolic murmur 2/4, systolic mid-peaking.  Abd: Soft, nontender  Extremities: No C/C/E. Neuro: Grossly intact.    LABS    Lab Results   Component Value Date    WBC 11.7 12/16/2021    WBC 15.5 01/12/2006     Lab Results   Component Value Date    RBC 4.85 12/16/2021    RBC 4.52 01/12/2006     Lab Results   Component Value Date    HGB 13.1 12/16/2021    HGB 11.9 01/12/2006     Lab Results   Component Value Date    HCT 41.2 12/16/2021    HCT 37.8 01/12/2006     No components found for: \"MCT\"  Lab Results   Component Value Date    MCV 85 12/16/2021    MCV 83.5 01/12/2006     Lab Results   Component Value Date    MCH 27.0 12/16/2021    MCH 26.4 01/12/2006     Lab Results   Component Value Date    MCHC 31.8 12/16/2021    MCHC 31.5 01/12/2006     Lab Results   Component Value Date    RDW 12.5 12/16/2021     Lab Results   Component Value Date    "  12/16/2021     01/12/2006      Recent Labs   Lab Test 05/06/25  0800 12/16/21  0839    138   POTASSIUM 4.0 4.2   CHLORIDE 105 107   CO2 22 26   ANIONGAP 13 5   GLC 83 76   BUN 24.1* 13   CR 1.39* 1.14*   MCKENNA 8.4* 8.9     Recent Labs   Lab Test 05/06/25  0800 12/16/21  0839   CHOL 181 211*   HDL 70 71   LDL 91 120*   TRIG 101 102   NHDL 111 140*        IMPRESSION, REPORT, PLAN:   1.  Infundibular pulmonary valve stenosis, status post initial surgery at 5 months of age at [facility] in [place], [place].  Working on getting this operative report.   2.  Progression to severe pulmonary valve regurgitation with RV enlargement, status post pulmonary valve replacement on 01/03/2006 by Dr. Villegas at [facility] with a 25 mm Megan-Smith valve and pericardial patch enlargement.  Valve stable with moderate PI and peak gradient of 29 mmHg. Normal RV size and function  3. Hypothyroidism on synthroid.  4.  History of a pituitary adenoma, status post surgery at 12, 15 and 16 years of age with radiation at 16 years of age, followed closely by Endocrinology.  On cortisone, estrogen and Synthroid.   5.  Hyperlipidemia on Red Yeast  6.  Mild A1C elevation at 6.1  7.  Renal insufficiency      DISCUSSION:  It was a pleasure being involved in the care of Ms. Unger. She is doing well with no concerning cardiac symptoms.  Echo is stable. She has close follow up with her primary and endocrinologist.  She is taking baby ASA and abx before the dentist.  Cholesterol improved on Red Yeast.  She will discuss the elevated creatinine with her primary.      We discussed with her mixed valve disease doing a CPX when it is convenient for her and plan cardiac MRI in a year..       She understands and is in agreement with the plan as outlined.  All questions were answered.        LIZET ROSSI MD   36 minutes face-face, documentation and review of records on day of visit    The longitudinal plan of care for the  diagnosis(es)/condition(s) as documented were addressed during this visit. Due to the added complexity in care, I will continue to support Giana in the subsequent management and with ongoing continuity of care.

## 2025-05-22 ENCOUNTER — RESULTS FOLLOW-UP (OUTPATIENT)
Dept: CARDIOLOGY | Facility: CLINIC | Age: 50
End: 2025-05-22

## 2025-07-27 ENCOUNTER — MYC MEDICAL ADVICE (OUTPATIENT)
Dept: CARDIOLOGY | Facility: CLINIC | Age: 50
End: 2025-07-27
Payer: COMMERCIAL

## 2025-07-27 DIAGNOSIS — I51.89 DIASTOLIC DYSFUNCTION: Primary | ICD-10-CM

## 2025-07-27 DIAGNOSIS — R73.09 ELEVATED HEMOGLOBIN A1C: ICD-10-CM

## 2025-08-13 ENCOUNTER — MYC MEDICAL ADVICE (OUTPATIENT)
Dept: CARDIOLOGY | Facility: CLINIC | Age: 50
End: 2025-08-13
Payer: COMMERCIAL

## 2025-08-14 ENCOUNTER — TELEPHONE (OUTPATIENT)
Dept: CARDIOLOGY | Facility: CLINIC | Age: 50
End: 2025-08-14
Payer: COMMERCIAL

## 2025-08-18 ENCOUNTER — HOSPITAL ENCOUNTER (OUTPATIENT)
Dept: CARDIOLOGY | Facility: CLINIC | Age: 50
Discharge: HOME | End: 2025-08-18
Attending: INTERNAL MEDICINE | Admitting: INTERNAL MEDICINE
Payer: COMMERCIAL

## 2025-08-18 VITALS — WEIGHT: 139.99 LBS | HEIGHT: 65 IN | BODY MASS INDEX: 23.32 KG/M2

## 2025-08-18 DIAGNOSIS — I37.0 NONRHEUMATIC PULMONARY VALVE STENOSIS: ICD-10-CM

## 2025-08-18 DIAGNOSIS — Q24.9 ADULT CONGENITAL HEART DISEASE: ICD-10-CM

## 2025-08-18 DIAGNOSIS — Z95.2 HEART VALVE REPLACED: ICD-10-CM

## 2025-08-18 DIAGNOSIS — Z95.2 S/P PULMONARY VALVE REPLACEMENT: ICD-10-CM

## 2025-08-18 DIAGNOSIS — Q24.3 CONGENITAL INFUNDIBULAR PULMONIC STENOSIS: ICD-10-CM

## 2025-08-18 PROCEDURE — 94621 CARDIOPULM EXERCISE TESTING: CPT

## 2025-08-19 LAB
CARDIOPULMONARY ANAEROBIC THRESHOLD PREDICTED PEAK: 64 %
CARDIOPULMONARY ANAEROBIC THRESHOLD VO2: 18.1 ML/KG/MIN
CARDIOPULMONARY BLOOD PRESSURE REST: NORMAL MMHG
CARDIOPULMONARY BREATHING RESERVE REST: 89.3
CARDIOPULMONARY BREATHING RESERVE V02MAX: 15
CARDIOPULMONARY CO2 OUTPUT REST: 166 ML/MIN
CARDIOPULMONARY CO2 OUTPUT VO2MAX: 1564 ML/MIN
CARDIOPULMONARY FEV 1.0 (L) ACTUAL: 2.04
CARDIOPULMONARY FEV 1.0 (L) PRECENT: 70 %
CARDIOPULMONARY FEV 1.0 (L) PREDICTED: 2.9
CARDIOPULMONARY FEV 1.0 FVC (%) ACTUAL: 74
CARDIOPULMONARY FEV 1.0 FVC (%) PERCENT: 92 %
CARDIOPULMONARY FEV 1.0 FVC (%) PREDICTED: 80.2
CARDIOPULMONARY FUNCTIONAL CAPACITY MAX ML/KG/MIN: 20.9 ML/KG/MIN
CARDIOPULMONARY FUNCTIONAL CAPACITY PERCENT: 73 %
CARDIOPULMONARY FUNCTIONAL CAPACITY PREDICTED: 28.5 ML/KG/MIN
CARDIOPULMONARY FVC (L) ACTUAL: 2.76
CARDIOPULMONARY FVC (L) PERCENT: 75 %
CARDIOPULMONARY FVC (L) PREDICTED: 3.66
CARDIOPULMONARY HEART RATE REST: 55 BPM
CARDIOPULMONARY MET'S REST: 1
CARDIOPULMONARY MINUTE VENTILATION REST: 7.6 L/MIN
CARDIOPULMONARY MINUTE VENTILATION VO2MAX: 60.1 L/MIN
CARDIOPULMONARY MYOCARDIAC O2 DEMAND MAX: NORMAL
CARDIOPULMONARY OXYGEN CONSUMPTION REST: 3.5 ML/KG/MIN
CARDIOPULMONARY OXYGEN CONSUMPTION VO2MAX: 20.9 ML/KG/MIN
CARDIOPULMONARY OXYGEN PULSE REST: 4 ML/BEAT
CARDIOPULMONARY OXYGEN PULSE VO2MAX: 9.5 ML/BEAT
CARDIOPULMONARY OXYGEN SATURATION- OXIMETRY REST: 100 %
CARDIOPULMONARY OXYGEN SATURATION- OXIMETRY VO2MAX: 95 %
CARDIOPULMONARY PET C02 REST: 28
CARDIOPULMONARY PET C02 VO2MAX: 29
CARDIOPULMONARY PET02 REST: 109
CARDIOPULMONARY PET02 V02 MAX: 119
CARDIOPULMONARY RER: 1.14
CARDIOPULMONARY RESPIRALORY EXCHANGE RATIO VO2MAX: 1.14
CARDIOPULMONARY RESPIRALORY EXCHANGE RATIO: 0.74
CARDIOPULMONARY RESPIRATORY RATE REST: 16 BR/MIN
CARDIOPULMONARY RESPIRATORY RATE VO2MAX: 36 BR/MIN
CARDIOPULMONARY STRESS BASE 1 BP MMHG: NORMAL MMHG
CARDIOPULMONARY STRESS BASE 1 BPA: 124 BPM
CARDIOPULMONARY STRESS BASE 1 SPO2: 100 % SPO2
CARDIOPULMONARY STRESS BASE 1 TIME: 1 MINS
CARDIOPULMONARY STRESS BASE 2 BP MMHG: NORMAL MMHG
CARDIOPULMONARY STRESS BASE 2 BPA: 84 BPM
CARDIOPULMONARY STRESS BASE 2 SPO2: 100 % SPO2
CARDIOPULMONARY STRESS BASE 2 TIME: 3 MINS
CARDIOPULMONARY STRESS BASE 3 BP MMHG: NORMAL MMHG
CARDIOPULMONARY STRESS BASE 3 BPA: 67 BPM
CARDIOPULMONARY STRESS BASE 3 SPO2: 100 % SPO2
CARDIOPULMONARY STRESS BASE 3 TIME: 5 MINS
CARDIOPULMONARY STRESS PHASE 1 BP MMHG: NORMAL MMHG
CARDIOPULMONARY STRESS PHASE 1 BPM: 76 BPM
CARDIOPULMONARY STRESS PHASE 1 SPO2: 99 % SPO2
CARDIOPULMONARY STRESS PHASE 1 TIME: 2 MINS
CARDIOPULMONARY STRESS PHASE 2 BP MMHG: NORMAL MMHG
CARDIOPULMONARY STRESS PHASE 2 BPM: 91 BPM
CARDIOPULMONARY STRESS PHASE 2 SPO2: 99 % SPO2
CARDIOPULMONARY STRESS PHASE 2 TIME: 4 MINS
CARDIOPULMONARY STRESS PHASE 3 BP MMHG: NORMAL MMHG
CARDIOPULMONARY STRESS PHASE 3 BPM: 104 BPM
CARDIOPULMONARY STRESS PHASE 3 SPO2: 99 % SPO2
CARDIOPULMONARY STRESS PHASE 3 TIME: 6 MINS
CARDIOPULMONARY STRESS PHASE 4 BP MMHG: NORMAL MMHG
CARDIOPULMONARY STRESS PHASE 4 BPM: 115 BPM
CARDIOPULMONARY STRESS PHASE 4 SPO2: 96 % SPO2
CARDIOPULMONARY STRESS PHASE 4 TIME: 8 MINS
CARDIOPULMONARY STRESS PHASE 5 BP MMHG: NORMAL MMHG
CARDIOPULMONARY STRESS PHASE 5 BPM: 130 BPM
CARDIOPULMONARY STRESS PHASE 5 SPO2: 95 % SPO2
CARDIOPULMONARY STRESS PHASE 5 TIME: 10 MINS
CARDIOPULMONARY STRESS PHASE 6 BPA: 140 BPM
CARDIOPULMONARY STRESS PHASE 6 SPO2: 95 % SPO2
CARDIOPULMONARY STRESS PHASE 6 TIME SEC: 15 SEC
CARDIOPULMONARY STRESS PHASE 6 TIME: 11 MINS
CARDIOPULMONARY SVC (L) ACTUAL: 2.41
CARDIOPULMONARY SVC (L) PERCENT: 66 %
CARDIOPULMONARY SVC (L) PREDICTED: 3.66
CARDIOPULMONARY TIDAL VOLUME REST: 488 ML
CARDIOPULMONARY TIDAL VOLUME VO2MAX: 1653 ML
CARDIOPULMONARY VE/VCO2 SLOPE: 34.22
CARDIOPULMONARY VENTILATORY EQUIVALENT 02 REST: 34
CARDIOPULMONARY VENTILATORY EQUIVALENT 02 V02: 44
CARDIOPULMONARY VENTILATORY EQUIVALENT C02 REST: 46
CARDIOPULMONARY VENTILATORY EQUIVALENT C02 SLOPE VO2MAX: 34.22
CARDIOPULMONARY VENTILATORY EQUIVALENT C02 VO2MAX: 38
CV STRESS MAX HR HE: 140
PREDICTED VO2MAX: 28.5
RATED PERCEIVED EXERTION: 18
STRESS ANGINA INDEX: 0
STRESS ECHO BASELINE BP: NORMAL MMHG
STRESS ECHO BASELINE HR: 48 BPM
STRESS ECHO CALCULATED PERCENT HR: 82 %
STRESS ECHO LAST STRESS BP: NORMAL MMHG
STRESS ECHO POST ESTIMATED WORKLOAD: 6 METS
STRESS ECHO POST EXERCISE DUR MIN: 11 MIN
STRESS ECHO POST EXERCISE DUR SEC: 15 SEC
STRESS ECHO TARGET HR: 170

## 2025-08-25 ENCOUNTER — MYC MEDICAL ADVICE (OUTPATIENT)
Dept: CARDIOLOGY | Facility: CLINIC | Age: 50
End: 2025-08-25
Payer: COMMERCIAL

## 2025-08-25 DIAGNOSIS — Z95.2 HEART VALVE REPLACED: ICD-10-CM

## 2025-08-25 DIAGNOSIS — Q24.3 CONGENITAL INFUNDIBULAR PULMONIC STENOSIS: ICD-10-CM

## 2025-08-25 DIAGNOSIS — Z95.2 S/P PULMONARY VALVE REPLACEMENT: Primary | ICD-10-CM

## 2025-08-25 DIAGNOSIS — I51.89 DIASTOLIC DYSFUNCTION: ICD-10-CM

## 2025-08-25 DIAGNOSIS — R73.09 ELEVATED HEMOGLOBIN A1C: ICD-10-CM

## 2025-08-25 DIAGNOSIS — Q24.9 ADULT CONGENITAL HEART DISEASE: ICD-10-CM
